# Patient Record
Sex: MALE | Race: WHITE | HISPANIC OR LATINO | Employment: OTHER | URBAN - METROPOLITAN AREA
[De-identification: names, ages, dates, MRNs, and addresses within clinical notes are randomized per-mention and may not be internally consistent; named-entity substitution may affect disease eponyms.]

---

## 2017-09-15 ENCOUNTER — OFFICE VISIT (OUTPATIENT)
Dept: FAMILY MEDICINE CLINIC | Facility: CLINIC | Age: 72
End: 2017-09-15

## 2017-09-15 VITALS
HEIGHT: 66 IN | WEIGHT: 137 LBS | BODY MASS INDEX: 22.02 KG/M2 | OXYGEN SATURATION: 97 % | SYSTOLIC BLOOD PRESSURE: 142 MMHG | HEART RATE: 61 BPM | RESPIRATION RATE: 16 BRPM | DIASTOLIC BLOOD PRESSURE: 80 MMHG

## 2017-09-15 DIAGNOSIS — Z11.4 ENCOUNTER FOR SCREENING FOR HIV: ICD-10-CM

## 2017-09-15 DIAGNOSIS — N40.1 BENIGN NODULAR PROSTATIC HYPERPLASIA WITH LOWER URINARY TRACT SYMPTOMS: ICD-10-CM

## 2017-09-15 DIAGNOSIS — E55.9 VITAMIN D DEFICIENCY: ICD-10-CM

## 2017-09-15 DIAGNOSIS — Z79.4 TYPE 2 DIABETES MELLITUS WITHOUT COMPLICATION, WITH LONG-TERM CURRENT USE OF INSULIN (HCC): ICD-10-CM

## 2017-09-15 DIAGNOSIS — Z00.00 INITIAL MEDICARE ANNUAL WELLNESS VISIT: Primary | ICD-10-CM

## 2017-09-15 DIAGNOSIS — Z23 IMMUNIZATION DUE: ICD-10-CM

## 2017-09-15 DIAGNOSIS — R35.1 NOCTURIA: ICD-10-CM

## 2017-09-15 DIAGNOSIS — E11.9 TYPE 2 DIABETES MELLITUS WITHOUT COMPLICATION, WITH LONG-TERM CURRENT USE OF INSULIN (HCC): ICD-10-CM

## 2017-09-15 LAB
BILIRUB BLD-MCNC: NEGATIVE MG/DL
CLARITY, POC: CLEAR
COLOR UR: YELLOW
GLUCOSE UR STRIP-MCNC: ABNORMAL MG/DL
HBA1C MFR BLD: 12.3 %
KETONES UR QL: NEGATIVE
LEUKOCYTE EST, POC: NEGATIVE
NITRITE UR-MCNC: NEGATIVE MG/ML
PH UR: 5.5 [PH] (ref 5–8)
POC CREATININE URINE: 100
POC MICROALBUMIN URINE: 80
PROT UR STRIP-MCNC: ABNORMAL MG/DL
RBC # UR STRIP: ABNORMAL /UL
SP GR UR: 1.01 (ref 1–1.03)
UROBILINOGEN UR QL: NORMAL

## 2017-09-15 PROCEDURE — 90670 PCV13 VACCINE IM: CPT | Performed by: FAMILY MEDICINE

## 2017-09-15 PROCEDURE — 83036 HEMOGLOBIN GLYCOSYLATED A1C: CPT | Performed by: FAMILY MEDICINE

## 2017-09-15 PROCEDURE — 81003 URINALYSIS AUTO W/O SCOPE: CPT | Performed by: FAMILY MEDICINE

## 2017-09-15 PROCEDURE — G0438 PPPS, INITIAL VISIT: HCPCS | Performed by: FAMILY MEDICINE

## 2017-09-15 PROCEDURE — G0008 ADMIN INFLUENZA VIRUS VAC: HCPCS | Performed by: FAMILY MEDICINE

## 2017-09-15 PROCEDURE — G0009 ADMIN PNEUMOCOCCAL VACCINE: HCPCS | Performed by: FAMILY MEDICINE

## 2017-09-15 PROCEDURE — 90662 IIV NO PRSV INCREASED AG IM: CPT | Performed by: FAMILY MEDICINE

## 2017-09-15 PROCEDURE — 93000 ELECTROCARDIOGRAM COMPLETE: CPT | Performed by: FAMILY MEDICINE

## 2017-09-15 PROCEDURE — 99213 OFFICE O/P EST LOW 20 MIN: CPT | Performed by: FAMILY MEDICINE

## 2017-09-15 PROCEDURE — 82044 UR ALBUMIN SEMIQUANTITATIVE: CPT | Performed by: FAMILY MEDICINE

## 2017-09-15 RX ORDER — LISINOPRIL 20 MG/1
20 TABLET ORAL DAILY
Qty: 90 TABLET | Refills: 3 | Status: SHIPPED | OUTPATIENT
Start: 2017-09-15 | End: 2019-09-12 | Stop reason: SDUPTHER

## 2017-09-15 RX ORDER — TAMSULOSIN HYDROCHLORIDE 0.4 MG/1
1 CAPSULE ORAL NIGHTLY
Qty: 90 CAPSULE | Refills: 1 | Status: SHIPPED | OUTPATIENT
Start: 2017-09-15 | End: 2019-09-12

## 2017-09-15 RX ORDER — SYRINGE-NEEDLE,INSULIN,0.5 ML 27GX1/2"
SYRINGE, EMPTY DISPOSABLE MISCELLANEOUS
Qty: 100 EACH | Refills: 11 | Status: SHIPPED | OUTPATIENT
Start: 2017-09-15 | End: 2018-10-30 | Stop reason: SDUPTHER

## 2017-09-15 RX ORDER — ATORVASTATIN CALCIUM 20 MG/1
20 TABLET, FILM COATED ORAL DAILY
Qty: 30 TABLET | Refills: 3 | Status: SHIPPED | OUTPATIENT
Start: 2017-09-15 | End: 2018-10-30 | Stop reason: SDUPTHER

## 2017-09-15 NOTE — PROGRESS NOTES
The ABCs of the Annual Wellness Visit    HEALTH RISK ASSESSMENT  INITIAL Medicare Wellness Visit  1945    Recent Hospitalizations: NONE    Current Medical Providers: Patient Care Team:  Jai Rubio MD as PCP - Family Medicine    Smoking Status   History   Smoking Status   • Never Smoker   Smokeless Tobacco   • Never Used     Alcohol Consumption   History   Alcohol Use No     Depression Screen   PHQ-2 Depression Screening  Little interest or pleasure in doing things? 0   Feeling down, depressed, or hopeless? 0   PHQ-2 Total Score 0     Fall Risk Assessment  Fallen in past 6 months: 0--> No  Mental Status: 0--> no mental status change  Mobility: 0--> No mobility issues  Medications: 0--> No meds  Total Fall Risk Score: 2    Does the patient have evidence of cognitive impairment? No  Aspirin use counseling: Aspirin 81 mg po once daily advised.    Recent Lab Results:  None to review.  Previous care in [place] (data deficit today)    Subjective     History of Present Illness  Michael Valles is a 71 y.o. male who presents for an Initial Wellness Visit.  He is also here to discuss his DM2.  He reports previous care in [place].  He would like a refill of his insulin previously prescribed in [place].  He takes NPH BID.  He has been out of his medication.  He describes non-compliance with routine blood sugar monitoring.  He reports that his last eye exam was in 2016 when he had cataract surgery (he does not recall MD's name).  He is amendable to referrals and equipment scripts.  He plans to implement lifestyle changes including routine blood sugar monitoring.    Review of Systems   Constitutional: Negative.    HENT: Negative.    Eyes: Negative.    Respiratory: Negative.    Cardiovascular: Negative.    Gastrointestinal: Negative.    Endocrine: Negative.  Negative for polydipsia, polyphagia and polyuria.   Genitourinary: Negative.  Negative for difficulty urinating and hematuria.   Musculoskeletal: Negative.   "  Skin: Negative.    Allergic/Immunologic: Negative.    Neurological: Negative.    Hematological: Negative.    Psychiatric/Behavioral: Negative.    All other systems reviewed and are negative.    The following portions of the patient's history were reviewed and updated as appropriate: past medical history, past surgical history, allergies, current medications, past family history and social history.      Objective     Visual Acuity    Visual Acuity Screening    Right eye Left eye Both eyes   With correction: 20/20 20/20 20/20     Vitals:    09/15/17 1517   BP: 142/80   Pulse: 61   Resp: 16   SpO2: 97%   Weight: 137 lb (62.1 kg)   Height: 66\" (167.6 cm)   PainSc: 0-No pain     Body mass index is 22.11 kg/(m^2). Discussed the patient's BMI with him. The BMI is in the acceptable range.    Physical Exam   Constitutional: He is oriented to person, place, and time. He appears well-developed and well-nourished. He is cooperative.   HENT:   Head: Normocephalic and atraumatic.   Right Ear: Hearing and external ear normal.   Left Ear: Hearing and external ear normal.   Nose: Nose normal.   Mouth/Throat: Uvula is midline, oropharynx is clear and moist and mucous membranes are normal.   Eyes: Conjunctivae and EOM are normal. Pupils are equal, round, and reactive to light. No scleral icterus.   Neck: Trachea normal and normal range of motion. Neck supple. No JVD present. Carotid bruit is not present. No thyromegaly present.   Cardiovascular: Normal rate, regular rhythm, normal heart sounds and intact distal pulses.    Pulmonary/Chest: Effort normal and breath sounds normal.   Abdominal: Soft. Bowel sounds are normal. There is no hepatosplenomegaly. There is no tenderness.   Musculoskeletal: Normal range of motion.    Michael had a diabetic foot exam performed today.   During the foot exam he had a monofilament test performed.    Neurological Sensory Findings - Unaltered hot/cold right ankle/foot discrimination and unaltered " hot/cold left ankle/foot discrimination. Unaltered sharp/dull right ankle/foot discrimination and unaltered sharp/dull left ankle/foot discrimination. No right ankle/foot altered proprioception and no left ankle/foot altered proprioception    Vascular Status -  His exam exhibits right foot vasculature normal. His exam exhibits no right foot edema. His exam exhibits left foot vasculature normal. His exam exhibits no left foot edema.   Skin Integrity  -  His right foot skin is intact.     Michael 's left foot skin is intact. .  Lymphadenopathy:     He has no cervical adenopathy.   Neurological: He is alert and oriented to person, place, and time. He has normal strength and normal reflexes. No sensory deficit. Gait normal.   Skin: Skin is warm and dry.   Psychiatric: He has a normal mood and affect. His speech is normal and behavior is normal. Judgment and thought content normal. Cognition and memory are normal.   Nursing note and vitals reviewed.        ECG 12 Lead  Date/Time: 9/15/2017 4:06 PM  Performed by: KEREN VEGA  Authorized by: KEREN VEGA   Comparison: not compared with previous ECG   Previous ECG: no previous ECG available  Rhythm: sinus rhythm  Rate: normal  BPM: 60  Conduction: conduction normal  ST Segments: ST segments normal  T Waves: T waves normal  QRS axis: normal  Clinical impression: normal ECG        HgbA1c is 12.3% today    Compared to one year ago, the patient feels his physical health is the same.  Compared to one year ago, the patient feels his mental health is the same.    Age-appropriate Screening Schedule  Refer to the list below for future screening recommendations based on patient's age, sex and/or medical conditions. Orders for these recommended tests are listed in the plan section. The patient has been provided with a written plan.    Health Maintenance   Topic Date Due   • HEMOGLOBIN A1C  03/15/2018   • PNEUMOCOCCAL VACCINES (65+ LOW/MEDIUM RISK) (2 of 2 - PPSV23) 09/15/2018   •  DIABETIC FOOT EXAM  09/15/2018   • LIPID PANEL  09/15/2018   • DIABETIC EYE EXAM  09/15/2018   • URINE MICROALBUMIN  09/15/2018   • COLONOSCOPY  09/15/2027   • TDAP/TD VACCINES (2 - Td) 09/15/2027   • INFLUENZA VACCINE  Completed   • ZOSTER VACCINE  Addressed     Advance Care Planning  has NO advance directive - not interested in additional information    Identification of Risk Factors  Risk factors include: cardiovascular risk.    Assessment/Plan   Patient Self-Management and Personalized Health Advice  The patient has been provided with information about: diet, exercise, prevention of cardiac or vascular disease, fall prevention and designing advance directives and preventive services including:   · Advance directive, Colorectal cancer screening, colonoscopy referral placed, Counseling for cardiovascular disease risk reduction, Diabetes screening, see lab orders, Exercise counseling provided, Fall Risk assessment done, Glaucoma screening recommended, Influenza vaccine, Nutrition counseling provided, Pneumococcal vaccine , Prostate cancer screening discussed, Screening electrocardiogram.    Visit Diagnoses:    ICD-10-CM ICD-9-CM   1. Initial Medicare annual wellness visit Z00.00 V70.0   2. Type 2 diabetes mellitus without complication, with long-term current use of insulin E11.9 250.00    Z79.4 V58.67   3. Benign nodular prostatic hyperplasia with lower urinary tract symptoms N40.1 600.10   4. Nocturia  R35.1 788.43   5. Immunization due Z23 V05.9   6. Encounter for screening for HIV  Z11.4 V73.89   7. Vitamin D deficiency  E55.9 268.9     Orders Placed This Encounter   Procedures   • Pneumococcal Conjugate Vaccine 13-Valent All   • Flu Vaccine High Dose PF 65YR+ (FLUZONE 0535-2085)    Vaccine counseling and current VIS is provided for immunizations administered today.   • Comprehensive Metabolic Panel   • Lipid Panel   • TSH   • T4, Free   • Uric Acid   • C-reactive Protein   • PSA   • HIV-1 / O / 2 Ag / Antibody  "4th Generation   • Hepatitis C Antibody   • Vitamin D 25 Hydroxy   • Ambulatory Referral For Screening Colonoscopy   • Ambulatory Referral to Diabetic Education   • Ambulatory Referral to Ophthalmology   • POC Urinalysis Dipstick, Automated   • POC Glycosylated Hemoglobin (Hb A1C)   • POC Microalbumin   • ECG 12 Lead   • CBC & Differential       Outpatient Encounter Prescriptions as of 9/15/2017   Medication Sig Dispense Refill   • atorvastatin (LIPITOR) 20 MG tablet Take 1 tablet by mouth Daily. 30 tablet 3   • Blood Glucose Monitoring Suppl kit Use to check blood sugar BID 1 each 0   • glucose blood test strip Use to check blood sugar BID 60 each 12   • NOVOLIN 70/30 100 UNIT/ML injection Inject 20 Units under the skin 2 Times a Day With Meals. 10 mL 3   • Syringe-Needle U-100 31G X 5/16\" 1 ML Use to administer insulin  each 11   • Lancets 30G misc Use to check blood sugar BID 60 each 11   • lisinopril 20 MG tablet Take 1 tablet by mouth Daily. 90 tablet 3   • tamsulosin 0.4 MG capsule 24 hr cap Take 1 capsule by mouth Every Night. 90 capsule 1     No facility-administered encounter medications on file as of 9/15/2017.      Reviewed use of high risk medication in the elderly: Not applicable.  Reviewed for potential of harmful drug interactions in the elderly: Not applicable.    Follow Up:  Return in about 3 months (around 12/15/2017), or if symptoms worsen or fail to improve.     An After Visit Summary and PPPS with all of these plans were given to the patient.        Jai Rubio MD 09/15/17 6:14 PM    "

## 2017-09-16 LAB
25(OH)D3+25(OH)D2 SERPL-MCNC: 22.9 NG/ML (ref 30–100)
ALBUMIN SERPL-MCNC: 3.9 G/DL (ref 3.5–4.8)
ALBUMIN/GLOB SERPL: 1.3 {RATIO} (ref 1.2–2.2)
ALP SERPL-CCNC: 89 IU/L (ref 39–117)
ALT SERPL-CCNC: 15 IU/L (ref 0–44)
AST SERPL-CCNC: 8 IU/L (ref 0–40)
BASOPHILS # BLD AUTO: 0.1 X10E3/UL (ref 0–0.2)
BASOPHILS NFR BLD AUTO: 1 %
BILIRUB SERPL-MCNC: 0.3 MG/DL (ref 0–1.2)
BUN SERPL-MCNC: 19 MG/DL (ref 8–27)
BUN/CREAT SERPL: 20 (ref 10–24)
CALCIUM SERPL-MCNC: 9.4 MG/DL (ref 8.6–10.2)
CHLORIDE SERPL-SCNC: 97 MMOL/L (ref 96–106)
CHOLEST SERPL-MCNC: 196 MG/DL (ref 100–199)
CO2 SERPL-SCNC: 19 MMOL/L (ref 18–29)
CREAT SERPL-MCNC: 0.93 MG/DL (ref 0.76–1.27)
CRP SERPL-MCNC: <0.3 MG/L (ref 0–4.9)
EOSINOPHIL # BLD AUTO: 0.2 X10E3/UL (ref 0–0.4)
EOSINOPHIL NFR BLD AUTO: 2 %
ERYTHROCYTE [DISTWIDTH] IN BLOOD BY AUTOMATED COUNT: 13.3 % (ref 12.3–15.4)
GLOBULIN SER CALC-MCNC: 2.9 G/DL (ref 1.5–4.5)
GLUCOSE SERPL-MCNC: 322 MG/DL (ref 65–99)
HCT VFR BLD AUTO: 43.9 % (ref 37.5–51)
HCV AB S/CO SERPL IA: NORMAL S/CO RATIO
HDLC SERPL-MCNC: 64 MG/DL
HGB BLD-MCNC: 14.5 G/DL (ref 12.6–17.7)
HIV 1+2 AB+HIV1 P24 AG SERPL QL IA: NORMAL
IMM GRANULOCYTES # BLD: 0 X10E3/UL (ref 0–0.1)
IMM GRANULOCYTES NFR BLD: 0 %
LDLC SERPL CALC-MCNC: 114 MG/DL (ref 0–99)
LYMPHOCYTES # BLD AUTO: 3 X10E3/UL (ref 0.7–3.1)
LYMPHOCYTES NFR BLD AUTO: 35 %
Lab: NORMAL
MCH RBC QN AUTO: 31.9 PG (ref 26.6–33)
MCHC RBC AUTO-ENTMCNC: 33 G/DL (ref 31.5–35.7)
MCV RBC AUTO: 97 FL (ref 79–97)
MONOCYTES # BLD AUTO: 0.5 X10E3/UL (ref 0.1–0.9)
MONOCYTES NFR BLD AUTO: 6 %
NEUTROPHILS # BLD AUTO: 4.8 X10E3/UL (ref 1.4–7)
NEUTROPHILS NFR BLD AUTO: 56 %
PLATELET # BLD AUTO: 164 X10E3/UL (ref 150–379)
POTASSIUM SERPL-SCNC: 5.1 MMOL/L (ref 3.5–5.2)
PROT SERPL-MCNC: 6.8 G/DL (ref 6–8.5)
PSA SERPL-MCNC: 0.3 NG/ML (ref 0–4)
RBC # BLD AUTO: 4.55 X10E6/UL (ref 4.14–5.8)
SODIUM SERPL-SCNC: 137 MMOL/L (ref 134–144)
SPECIMEN STATUS: NORMAL
T4 FREE SERPL-MCNC: 1.27 NG/DL (ref 0.82–1.77)
TRIGL SERPL-MCNC: 89 MG/DL (ref 0–149)
TSH SERPL DL<=0.005 MIU/L-ACNC: 1.94 UIU/ML (ref 0.45–4.5)
URATE SERPL-MCNC: 3.7 MG/DL (ref 3.7–8.6)
VLDLC SERPL CALC-MCNC: 18 MG/DL (ref 5–40)
WBC # BLD AUTO: 8.5 X10E3/UL (ref 3.4–10.8)

## 2018-10-26 ENCOUNTER — OFFICE VISIT (OUTPATIENT)
Dept: FAMILY MEDICINE CLINIC | Facility: CLINIC | Age: 73
End: 2018-10-26

## 2018-10-26 VITALS
OXYGEN SATURATION: 98 % | HEIGHT: 66 IN | SYSTOLIC BLOOD PRESSURE: 146 MMHG | RESPIRATION RATE: 16 BRPM | HEART RATE: 58 BPM | DIASTOLIC BLOOD PRESSURE: 86 MMHG | TEMPERATURE: 97.6 F | WEIGHT: 133.8 LBS | BODY MASS INDEX: 21.5 KG/M2

## 2018-10-26 DIAGNOSIS — Z00.00 MEDICARE ANNUAL WELLNESS VISIT, SUBSEQUENT: Primary | ICD-10-CM

## 2018-10-26 DIAGNOSIS — Z23 IMMUNIZATION DUE: ICD-10-CM

## 2018-10-26 DIAGNOSIS — E11.9 TYPE 2 DIABETES MELLITUS WITHOUT COMPLICATION, WITH LONG-TERM CURRENT USE OF INSULIN (HCC): ICD-10-CM

## 2018-10-26 DIAGNOSIS — F51.04 PSYCHOPHYSIOLOGICAL INSOMNIA: ICD-10-CM

## 2018-10-26 DIAGNOSIS — Z79.4 TYPE 2 DIABETES MELLITUS WITHOUT COMPLICATION, WITH LONG-TERM CURRENT USE OF INSULIN (HCC): ICD-10-CM

## 2018-10-26 DIAGNOSIS — Z12.5 PROSTATE CANCER SCREENING: ICD-10-CM

## 2018-10-26 LAB
ALBUMIN SERPL-MCNC: 3.99 G/DL (ref 3.2–4.8)
ALBUMIN/GLOB SERPL: 1.7 G/DL (ref 1.5–2.5)
ALP SERPL-CCNC: 84 U/L (ref 25–100)
ALT SERPL W P-5'-P-CCNC: 18 U/L (ref 7–40)
ANION GAP SERPL CALCULATED.3IONS-SCNC: 6 MMOL/L (ref 3–11)
ARTICHOKE IGE QN: 119 MG/DL (ref 0–130)
AST SERPL-CCNC: 15 U/L (ref 0–33)
BASOPHILS # BLD AUTO: 0.07 10*3/MM3 (ref 0–0.2)
BASOPHILS NFR BLD AUTO: 0.8 % (ref 0–1)
BILIRUB SERPL-MCNC: 0.5 MG/DL (ref 0.3–1.2)
BUN BLD-MCNC: 20 MG/DL (ref 9–23)
BUN/CREAT SERPL: 22.2 (ref 7–25)
CALCIUM SPEC-SCNC: 9.1 MG/DL (ref 8.7–10.4)
CHLORIDE SERPL-SCNC: 99 MMOL/L (ref 99–109)
CHOLEST SERPL-MCNC: 182 MG/DL (ref 0–200)
CO2 SERPL-SCNC: 30 MMOL/L (ref 20–31)
CREAT BLD-MCNC: 0.9 MG/DL (ref 0.6–1.3)
DEPRECATED RDW RBC AUTO: 45.2 FL (ref 37–54)
EOSINOPHIL # BLD AUTO: 0.23 10*3/MM3 (ref 0–0.3)
EOSINOPHIL NFR BLD AUTO: 2.6 % (ref 0–3)
ERYTHROCYTE [DISTWIDTH] IN BLOOD BY AUTOMATED COUNT: 13 % (ref 11.3–14.5)
GFR SERPL CREATININE-BSD FRML MDRD: 83 ML/MIN/1.73
GLOBULIN UR ELPH-MCNC: 2.4 GM/DL
GLUCOSE BLD-MCNC: 230 MG/DL (ref 70–100)
HBA1C MFR BLD: 13 % (ref 4.8–5.6)
HCT VFR BLD AUTO: 45.1 % (ref 38.9–50.9)
HDLC SERPL-MCNC: 53 MG/DL (ref 40–60)
HGB BLD-MCNC: 15.2 G/DL (ref 13.1–17.5)
IMM GRANULOCYTES # BLD: 0.06 10*3/MM3 (ref 0–0.03)
IMM GRANULOCYTES NFR BLD: 0.7 % (ref 0–0.6)
LYMPHOCYTES # BLD AUTO: 3.43 10*3/MM3 (ref 0.6–4.8)
LYMPHOCYTES NFR BLD AUTO: 38.2 % (ref 24–44)
MCH RBC QN AUTO: 32.1 PG (ref 27–31)
MCHC RBC AUTO-ENTMCNC: 33.7 G/DL (ref 32–36)
MCV RBC AUTO: 95.1 FL (ref 80–99)
MONOCYTES # BLD AUTO: 0.47 10*3/MM3 (ref 0–1)
MONOCYTES NFR BLD AUTO: 5.2 % (ref 0–12)
NEUTROPHILS # BLD AUTO: 4.79 10*3/MM3 (ref 1.5–8.3)
NEUTROPHILS NFR BLD AUTO: 53.2 % (ref 41–71)
PLATELET # BLD AUTO: 238 10*3/MM3 (ref 150–450)
PMV BLD AUTO: 12.4 FL (ref 6–12)
POTASSIUM BLD-SCNC: 5.2 MMOL/L (ref 3.5–5.5)
PROT SERPL-MCNC: 6.4 G/DL (ref 5.7–8.2)
PSA SERPL-MCNC: 0.32 NG/ML (ref 0–4)
RBC # BLD AUTO: 4.74 10*6/MM3 (ref 4.2–5.76)
SODIUM BLD-SCNC: 135 MMOL/L (ref 132–146)
TRIGL SERPL-MCNC: 122 MG/DL (ref 0–150)
TSH SERPL DL<=0.05 MIU/L-ACNC: 1.56 MIU/ML (ref 0.35–5.35)
URATE SERPL-MCNC: 3.9 MG/DL (ref 3.7–9.2)
WBC NRBC COR # BLD: 8.99 10*3/MM3 (ref 3.5–10.8)

## 2018-10-26 PROCEDURE — 36415 COLL VENOUS BLD VENIPUNCTURE: CPT | Performed by: FAMILY MEDICINE

## 2018-10-26 PROCEDURE — G0009 ADMIN PNEUMOCOCCAL VACCINE: HCPCS | Performed by: FAMILY MEDICINE

## 2018-10-26 PROCEDURE — 84550 ASSAY OF BLOOD/URIC ACID: CPT | Performed by: FAMILY MEDICINE

## 2018-10-26 PROCEDURE — G0439 PPPS, SUBSEQ VISIT: HCPCS | Performed by: FAMILY MEDICINE

## 2018-10-26 PROCEDURE — 90662 IIV NO PRSV INCREASED AG IM: CPT | Performed by: FAMILY MEDICINE

## 2018-10-26 PROCEDURE — 83036 HEMOGLOBIN GLYCOSYLATED A1C: CPT | Performed by: FAMILY MEDICINE

## 2018-10-26 PROCEDURE — 80061 LIPID PANEL: CPT | Performed by: FAMILY MEDICINE

## 2018-10-26 PROCEDURE — 84443 ASSAY THYROID STIM HORMONE: CPT | Performed by: FAMILY MEDICINE

## 2018-10-26 PROCEDURE — G0008 ADMIN INFLUENZA VIRUS VAC: HCPCS | Performed by: FAMILY MEDICINE

## 2018-10-26 PROCEDURE — 85025 COMPLETE CBC W/AUTO DIFF WBC: CPT | Performed by: FAMILY MEDICINE

## 2018-10-26 PROCEDURE — G0103 PSA SCREENING: HCPCS | Performed by: FAMILY MEDICINE

## 2018-10-26 PROCEDURE — 90732 PPSV23 VACC 2 YRS+ SUBQ/IM: CPT | Performed by: FAMILY MEDICINE

## 2018-10-26 PROCEDURE — 80053 COMPREHEN METABOLIC PANEL: CPT | Performed by: FAMILY MEDICINE

## 2018-10-26 RX ORDER — TRAZODONE HYDROCHLORIDE 50 MG/1
50 TABLET ORAL NIGHTLY
Qty: 30 TABLET | Refills: 5 | Status: SHIPPED | OUTPATIENT
Start: 2018-10-26 | End: 2019-09-12

## 2018-10-26 NOTE — PROGRESS NOTES
The ABCs of the Annual Wellness Visit    HEALTH RISK ASSESSMENT  SUBSEQUENT Medicare Wellness Visit   1945    Recent Hospitalizations: NONE    Current Medical Providers: Patient Care Team:  Jai Rubio MD as PCP - Family Medicine    Smoking Status   History   Smoking Status   • Never Smoker   Smokeless Tobacco   • Never Used     Alcohol Consumption   History   Alcohol Use No     Depression Screen   PHQ-2 Depression Screening  Little interest or pleasure in doing things? 0   Feeling down, depressed, or hopeless? 0   PHQ-2 Total Score 0        Health Habits and Functional and Cognitive Screening  Functional & Cognitive Status 10/26/2018   Do you have difficulty preparing food and eating? No   Do you have difficulty bathing yourself, getting dressed or grooming yourself? No   Do you have difficulty using the toilet? No   Do you have difficulty moving around from place to place? No   Do you have trouble with steps or getting out of a bed or a chair? No   In the past year have you fallen or experienced a near fall? No   Current Diet Well Balanced Diet   Dental Exam Up to date   Eye Exam Up to date   Exercise (times per week) 7 times per week   Current Exercise Activities Include Walking   Do you need help using the phone?  No   Are you deaf or do you have serious difficulty hearing?  No   Do you need help with transportation? No   Do you need help shopping? No   Do you need help preparing meals?  No   Do you need help with housework?  No   Do you need help with laundry? No   Do you need help taking your medications? No   Do you need help managing money? No   Do you ever drive or ride in a car without wearing a seat belt? No   Have you felt unusual stress, anger or loneliness in the last month? No   Who do you live with? Child   If you need help, do you have trouble finding someone available to you? No   Have you been bothered in the last four weeks by sexual problems? No   Do you have difficulty concentrating,  remembering or making decisions? No        Fall Risk Assessment  Fallen in past 6 months: 0--> No  Mental Status: 0--> no mental status change  Mobility: 0--> No mobility issues  Medications: 0--> No meds  Total Fall Risk Score: 2    Does the patient have evidence of cognitive impairment? No  Aspirin use counseling: Aspirin 81 mg po once daily advised.    Recent Lab Results:  Lab Results   Component Value Date     (H) 09/15/2017    BUN 20 10/26/2018    CREATININE 0.90 10/26/2018    EGFRIFNONA 83 10/26/2018    EGFRIFAFRI 95 09/15/2017    BCR 22.2 10/26/2018     10/26/2018    K 5.2 10/26/2018    CO2 30.0 10/26/2018    CALCIUM 9.1 10/26/2018    PROTENTOTREF 6.8 09/15/2017    ALBUMIN 3.99 10/26/2018    LABGLOBREF 2.9 09/15/2017    LABIL2 1.3 09/15/2017    BILITOT 0.5 10/26/2018    ALKPHOS 84 10/26/2018    AST 15 10/26/2018    ALT 18 10/26/2018       Lab Results   Component Value Date    CHLPL 196 09/15/2017    TRIG 122 10/26/2018    HDL 53 10/26/2018       Lab Results   Component Value Date    HGBA1C 13.00 (H) 10/26/2018       Subjective     History of Present Illness  Michael Valles is a 73 y.o. male who presents for an Subsequent Wellness Visit.  He is accompanied by his son.  He spends time in [facility] and is in [place] visiting family.  He continues care with his physician in [place] for his chronic DM2.  He voices no acute symptoms today.  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  He is requesting a medication to help him stay asleep at night.    Review of Systems   Constitutional: Negative.    HENT: Negative.    Eyes: Negative.    Respiratory: Negative.  Negative for shortness of breath.    Cardiovascular: Negative.  Negative for chest pain, palpitations and leg swelling.   Gastrointestinal: Negative.    Endocrine: Negative.  Negative for polydipsia, polyphagia and polyuria.   Genitourinary: Negative.  Negative for difficulty urinating and  "dysuria.   Musculoskeletal: Positive for arthralgias. Negative for gait problem.   Skin: Negative.    Allergic/Immunologic: Negative.    Neurological: Negative.    Hematological: Negative.    Psychiatric/Behavioral: Negative.    All other systems reviewed and are negative.      The following portions of the patient's history were reviewed and updated as appropriate: past medical history, past surgical history, allergies, current medications, past family history and social history.     Outpatient Medications Prior to Visit   Medication Sig Dispense Refill   • Blood Glucose Monitoring Suppl kit Use to check blood sugar BID 1 each 0   • glucose blood test strip Use to check blood sugar BID 60 each 12   • insulin NPH-insulin regular (NOVOLIN 70/30) (70-30) 100 UNIT/ML injection Inject 20 Units under the skin 2 (Two) Times a Day With Meals. 10 mL 3   • Insulin Syringe-Needle U-100 (B-D INS SYR ULTRAFINE 1CC/31G) 31G X 5/16\" 1 ML misc Use to administer insulin  each 11   • Lancets 30G misc Use to check blood sugar BID 60 each 11   • atorvastatin (LIPITOR) 20 MG tablet Take 1 tablet by mouth Daily. 30 tablet 3   • lisinopril (PRINIVIL,ZESTRIL) 20 MG tablet Take 1 tablet by mouth Daily. 90 tablet 3   • tamsulosin (FLOMAX) 0.4 MG capsule 24 hr capsule Take 1 capsule by mouth Every Night. 90 capsule 1     No facility-administered medications prior to visit.        Objective     Visual Acuity    Visual Acuity Screening    Right eye Left eye Both eyes   Without correction:      With correction: 20/20 20/20 20/20     Vitals:    10/26/18 1047   BP: 146/86   Pulse: 58   Resp: 16   Temp: 97.6 °F (36.4 °C)   SpO2: 98%   Weight: 60.7 kg (133 lb 12.8 oz)   Height: 167.6 cm (66\")   PainSc:   4     Body mass index is 21.6 kg/m². Discussed the patient's BMI with him. The BMI is in the acceptable range.    Physical Exam   Constitutional: He is oriented to person, place, and time. He appears well-developed and well-nourished. He is " cooperative.   HENT:   Head: Normocephalic and atraumatic.   Right Ear: Hearing and external ear normal.   Left Ear: Hearing and external ear normal.   Nose: Nose normal.   Mouth/Throat: Uvula is midline, oropharynx is clear and moist and mucous membranes are normal.   Eyes: Pupils are equal, round, and reactive to light. Conjunctivae and EOM are normal. No scleral icterus.   Neck: Trachea normal and normal range of motion. Neck supple. No JVD present. Carotid bruit is not present. No thyromegaly present.   Cardiovascular: Normal rate, regular rhythm, normal heart sounds and intact distal pulses.    Pulmonary/Chest: Effort normal and breath sounds normal.   Abdominal: Soft. Bowel sounds are normal. There is no hepatosplenomegaly. There is no tenderness.   Musculoskeletal: Normal range of motion.   Generalized osteoarthritic changes to his hands, knees and feet.    Michael had a diabetic foot exam performed today.   During the foot exam he had a monofilament test performed.  Lymphadenopathy:     He has no cervical adenopathy.   Neurological: He is alert and oriented to person, place, and time. He has normal strength and normal reflexes. No sensory deficit. Gait normal.   Skin: Skin is warm and dry.   Psychiatric: He has a normal mood and affect. His speech is normal and behavior is normal. Judgment and thought content normal. Cognition and memory are normal.   Nursing note and vitals reviewed.    Compared to one year ago, the patient feels his physical health is the same.  Compared to one year ago, the patient feels his mental health is the same.    Age-appropriate Screening Schedule  Refer to the list below for future screening recommendations based on patient's age, sex and/or medical conditions. Orders for these recommended tests are listed in the plan section. The patient has been provided with a written plan.    Health Maintenance   Topic Date Due   • HEMOGLOBIN A1C  04/26/2019   • DIABETIC FOOT EXAM   10/26/2019   • LIPID PANEL  10/26/2019   • DIABETIC EYE EXAM  10/26/2019   • URINE MICROALBUMIN  10/26/2019   • COLONOSCOPY  09/15/2027   • INFLUENZA VACCINE  Completed   • PNEUMOCOCCAL VACCINES (65+ LOW/MEDIUM RISK)  Completed   • TDAP/TD VACCINES  Excluded   • ZOSTER VACCINE  Excluded     Advance Care Planning  We discussed advance care planning and importance of providing & updating documentation for the medical record.    Identification of Risk Factors  Risk factors include: cardiovascular risk.    Assessment/Plan   Patient Self-Management and Personalized Health Advice  The patient has been provided with information about: diet, exercise and prevention of cardiac or vascular disease and preventive services including:   · Advance directive, Counseling for cardiovascular disease risk reduction, Diabetes screening, see lab orders, Exercise counseling provided, Fall Risk assessment done, Glaucoma screening recommended, Influenza vaccine, Nutrition counseling provided, Pneumococcal vaccine , Prostate cancer screening discussed.    Visit Diagnoses:    ICD-10-CM ICD-9-CM   1. Medicare annual wellness visit, subsequent Z00.00 V70.0   2. Type 2 diabetes mellitus without complication, with long-term current use of insulin (CMS/[facility]) E11.9 250.00    Z79.4 V58.67   3. Psychophysiological insomnia F51.04 307.42   4. Immunization due Z23 V05.9   5. Prostate cancer screening Z12.5 V76.44       Orders Placed This Encounter   Procedures   • Pneumococcal Polysaccharide Vaccine 23-Valent Greater Than or Equal To 1yo Subcutaneous / IM   • Fluzone High Dose =>65Years    Vaccine counseling and current VIS is provided for immunizations administered today.   • Comprehensive Metabolic Panel   • Lipid Panel   • TSH   • Uric Acid   • Hemoglobin A1c   • PSA Screen   • CBC Auto Differential       Current Outpatient Prescriptions:   •  insulin NPH-insulin regular (70-30) 100 UNIT/ML injection, Inject 20 Units under the skin 2 Times a Day  With Meals.  •  atorvastatin (LIPITOR) 20 MG tablet, Take 1 tablet by mouth Daily., Disp: 30 tablet, Rfl: 3  •  lisinopril (PRINIVIL,ZESTRIL) 20 MG tablet, Take 1 tablet by mouth Daily., Disp: 90 tablet, Rfl: 3  •  tamsulosin (FLOMAX) 0.4 MG capsule 24 hr capsule, Take 1 capsule by mouth Every Night., Disp: 90 capsule, Rfl: 1  •  traZODone (DESYREL) 50 MG tablet, Take 1 tablet by mouth Every Night., Disp: 30 tablet, Rfl: 5    Current outpatient and discharge medications have been reconciled for the patient.  Reviewed by: Jai Rubio MD    Reviewed use of high risk medication in the elderly: Not applicable.  Reviewed for potential of harmful drug interactions in the elderly: Not applicable.    Follow Up:  Return in about 1 year (around 10/26/2019), or if symptoms worsen or fail to improve, for Medicare Wellness.     An After Visit Summary and PPPS with all of these plans were given to the patient.         Jai Rubio MD 10/26/18 5:02 PM

## 2018-10-30 ENCOUNTER — OFFICE VISIT (OUTPATIENT)
Dept: FAMILY MEDICINE CLINIC | Facility: CLINIC | Age: 73
End: 2018-10-30

## 2018-10-30 VITALS
SYSTOLIC BLOOD PRESSURE: 136 MMHG | RESPIRATION RATE: 16 BRPM | DIASTOLIC BLOOD PRESSURE: 80 MMHG | OXYGEN SATURATION: 97 % | HEART RATE: 64 BPM | WEIGHT: 137.8 LBS | BODY MASS INDEX: 22.14 KG/M2 | HEIGHT: 66 IN

## 2018-10-30 DIAGNOSIS — R41.3 MEMORY CHANGES: ICD-10-CM

## 2018-10-30 DIAGNOSIS — I10 ESSENTIAL HYPERTENSION: ICD-10-CM

## 2018-10-30 DIAGNOSIS — E78.01 FAMILIAL HYPERCHOLESTEROLEMIA: ICD-10-CM

## 2018-10-30 DIAGNOSIS — E11.65 TYPE 2 DIABETES MELLITUS WITH HYPERGLYCEMIA, WITH LONG-TERM CURRENT USE OF INSULIN (HCC): Primary | ICD-10-CM

## 2018-10-30 DIAGNOSIS — Z79.4 TYPE 2 DIABETES MELLITUS WITH HYPERGLYCEMIA, WITH LONG-TERM CURRENT USE OF INSULIN (HCC): Primary | ICD-10-CM

## 2018-10-30 PROCEDURE — 99215 OFFICE O/P EST HI 40 MIN: CPT | Performed by: FAMILY MEDICINE

## 2018-10-30 RX ORDER — DONEPEZIL HYDROCHLORIDE 10 MG/1
10 TABLET, FILM COATED ORAL NIGHTLY
Qty: 30 TABLET | Refills: 3 | Status: SHIPPED | OUTPATIENT
Start: 2018-10-30 | End: 2019-09-12

## 2018-10-30 RX ORDER — SYRINGE-NEEDLE,INSULIN,0.5 ML 27GX1/2"
SYRINGE, EMPTY DISPOSABLE MISCELLANEOUS
Qty: 100 EACH | Refills: 11 | Status: SHIPPED | OUTPATIENT
Start: 2018-10-30 | End: 2018-11-13 | Stop reason: SDUPTHER

## 2018-10-30 RX ORDER — ATORVASTATIN CALCIUM 80 MG/1
80 TABLET, FILM COATED ORAL DAILY
Qty: 30 TABLET | Refills: 3 | Status: SHIPPED | OUTPATIENT
Start: 2018-10-30 | End: 2019-09-12 | Stop reason: SDUPTHER

## 2018-10-30 RX ORDER — INSULIN GLARGINE 100 [IU]/ML
10 INJECTION, SOLUTION SUBCUTANEOUS NIGHTLY
Qty: 3 PEN | Refills: 3 | Status: SHIPPED | OUTPATIENT
Start: 2018-10-30 | End: 2019-09-12 | Stop reason: SDUPTHER

## 2018-10-30 NOTE — PROGRESS NOTES
Subjective   Michael Valles is a 73 y.o. male.     History of Present Illness   The patient is here to follow up on his chronic medical problems which include DM2, HTN and HLD.  He is accompanied by his son Leif Cummings.  The patient is tolerating all of his medications well with no adverse events. He describes non-compliance with a healthy heart, diabetic diet.  He reports compliance with daily aerobic activity.  He reports an annual eye exam < one year with good results back in [place].  He describes lack of adherence to routine blood sugar monitoring and reports unknown blood sugar results.  He describes an absence of routine blood pressure monitoring. The patient reports ongoing good foot care and protection. The patient is needing multiple medication refills.  Today his son reports concerns with his father's memory.  He reports good and bad days.  His father does not drive.  He reports his short term memory is impaired.    Review of Systems   Constitutional: Negative for chills and fever.   Eyes: Negative for visual disturbance.   Respiratory: Negative for cough and shortness of breath.    Cardiovascular: Negative for chest pain, palpitations and leg swelling.   Gastrointestinal: Negative for abdominal pain, diarrhea, nausea and vomiting.   Genitourinary: Negative for difficulty urinating.   Musculoskeletal: Negative for arthralgias.   Skin: Negative for rash.   Neurological: Negative for dizziness, tremors, weakness and headaches.   Psychiatric/Behavioral: Positive for confusion. Negative for behavioral problems and hallucinations. The patient is not nervous/anxious.    All other systems reviewed and are negative.      Objective   Physical Exam   Constitutional: He is oriented to person, place, and time. He appears well-developed and well-nourished.   HENT:   Head: Normocephalic and atraumatic.   Right Ear: Hearing normal.   Left Ear: Hearing normal.   Mouth/Throat: Mucous membranes are normal.   Eyes:  "Pupils are equal, round, and reactive to light. Conjunctivae and EOM are normal.   Neck: Neck supple. No JVD present. No thyromegaly present.   Cardiovascular: Normal rate, regular rhythm and normal heart sounds.    Pulmonary/Chest: Effort normal and breath sounds normal.   Musculoskeletal: Normal range of motion.   Neurological: He is alert and oriented to person, place, and time. He has normal strength. No sensory deficit. Gait normal.   Skin: Skin is warm and dry.   Psychiatric: He has a normal mood and affect. His behavior is normal. Judgment and thought content normal. He exhibits abnormal recent memory.   Nursing note and vitals reviewed.    Recent A1c 13%    Assessment/Plan   Diagnoses and all orders for this visit:    Type 2 diabetes mellitus with hyperglycemia, with long-term current use of insulin (CMS/[facility])  -     Increase atorvastatin (LIPITOR) 80 MG tablet; Take 1 tablet by mouth Daily.  -     NOVOLIN 70/30 100 UNIT/ML injection; Inject 20 Units sq 2 Times a Day With Meals.  -     Start BASAGLAR KWIKPEN 100 UNIT/ML injection pen; Inject 10 Units sq q HS.  -     metFORMIN 500 MG tablet; Take 2 tablets by mouth 2 Times a Day With Meals.  -     Ambulatory Referral to Endocrinology  -     Ambulatory Referral to Diabetic Education  -     Ambulatory Referral to Ophthalmology  -     Insulin Syringe-Needle U-100  31G X 5/16\" 1 ML misc; Use to administer insulin BID  -     glucose blood (ACCU-CHEK COMPACT PLUS) test strip; Use to check blood sugar BID  -     Lancets (ACCU-CHEK SOFT TOUCH) lancets; Use to check blood sugar BID    Essential hypertension  - Healthy heart diet  - Lisinopril 20 mg po daily  - Daily aerobic exercise  - Routine blood pressure monitoring  - [place] Heart Disease and Stroke Prevention Task Force pamphlet reviewed and administered.    Familial hypercholesterolemia  - Low cholesterol diet (< 200 mg / day)  - Increase Lipitor 80 mg po once daily  - Daily aerobic exercise  - Aspirin 81 mg po " once daily    Memory changes  -     Start donepezil (ARICEPT) 10 MG tablet; Take 1 tablet by mouth Every Night.  - Trazadone 50 mg q HS prn sleep disturbance  -    Today I have spent a total of 40 minutes face to face with Michael Valles and his son today.  During this time, a total of 21 minutes was spent counseling on the nature of the diagnosis including risks and benefits of treatment, complications, implications, management, safe and proper use of medications.  We discussed the work up and specialist referral process if needed for his memory changes.  Today I encouraged therapeutic lifestyle changes including a low calorie, healthy heart diet, daily aerobic exercise and medication compliance.  Patient education is provided today concerning related diagnosis with educational resources reviewed and discussed.  I encouraged compliance with follow up appointment.          RTC in 3 months.

## 2018-11-07 ENCOUNTER — HOSPITAL ENCOUNTER (OUTPATIENT)
Dept: DIABETES SERVICES | Facility: HOSPITAL | Age: 73
Setting detail: RECURRING SERIES
Discharge: HOME | End: 2018-11-07

## 2018-11-07 PROCEDURE — G0108 DIAB MANAGE TRN  PER INDIV: HCPCS | Performed by: DIETITIAN, REGISTERED

## 2018-11-13 DIAGNOSIS — E11.65 TYPE 2 DIABETES MELLITUS WITH HYPERGLYCEMIA, WITH LONG-TERM CURRENT USE OF INSULIN (HCC): ICD-10-CM

## 2018-11-13 DIAGNOSIS — Z79.4 TYPE 2 DIABETES MELLITUS WITH HYPERGLYCEMIA, WITH LONG-TERM CURRENT USE OF INSULIN (HCC): ICD-10-CM

## 2018-11-13 RX ORDER — SYRINGE-NEEDLE,INSULIN,0.5 ML 27GX1/2"
SYRINGE, EMPTY DISPOSABLE MISCELLANEOUS
Qty: 100 EACH | Refills: 11 | Status: SHIPPED | OUTPATIENT
Start: 2018-11-13 | End: 2019-09-12 | Stop reason: SDUPTHER

## 2019-09-12 ENCOUNTER — OFFICE VISIT (OUTPATIENT)
Dept: FAMILY MEDICINE CLINIC | Facility: CLINIC | Age: 74
End: 2019-09-12

## 2019-09-12 VITALS
SYSTOLIC BLOOD PRESSURE: 128 MMHG | TEMPERATURE: 97.6 F | OXYGEN SATURATION: 97 % | HEART RATE: 75 BPM | BODY MASS INDEX: 22.82 KG/M2 | WEIGHT: 142 LBS | HEIGHT: 66 IN | DIASTOLIC BLOOD PRESSURE: 72 MMHG

## 2019-09-12 DIAGNOSIS — Z79.4 TYPE 2 DIABETES MELLITUS WITH HYPERGLYCEMIA, WITH LONG-TERM CURRENT USE OF INSULIN (HCC): Primary | ICD-10-CM

## 2019-09-12 DIAGNOSIS — E11.65 TYPE 2 DIABETES MELLITUS WITH HYPERGLYCEMIA, WITH LONG-TERM CURRENT USE OF INSULIN (HCC): Primary | ICD-10-CM

## 2019-09-12 DIAGNOSIS — I10 ESSENTIAL HYPERTENSION: ICD-10-CM

## 2019-09-12 DIAGNOSIS — E78.01 FAMILIAL HYPERCHOLESTEROLEMIA: ICD-10-CM

## 2019-09-12 LAB
ALBUMIN SERPL-MCNC: 4.4 G/DL (ref 3.5–5.2)
ALBUMIN/GLOB SERPL: 1.6 G/DL
ALP SERPL-CCNC: 83 U/L (ref 39–117)
ALT SERPL W P-5'-P-CCNC: 13 U/L (ref 1–41)
ANION GAP SERPL CALCULATED.3IONS-SCNC: 10 MMOL/L (ref 5–15)
AST SERPL-CCNC: 11 U/L (ref 1–40)
BASOPHILS # BLD AUTO: 0.08 10*3/MM3 (ref 0–0.2)
BASOPHILS NFR BLD AUTO: 1 % (ref 0–1.5)
BILIRUB BLD-MCNC: NEGATIVE MG/DL
BILIRUB SERPL-MCNC: 0.2 MG/DL (ref 0.2–1.2)
BUN BLD-MCNC: 24 MG/DL (ref 8–23)
BUN/CREAT SERPL: 27.6 (ref 7–25)
CALCIUM SPEC-SCNC: 9.1 MG/DL (ref 8.6–10.5)
CHLORIDE SERPL-SCNC: 101 MMOL/L (ref 98–107)
CHOLEST SERPL-MCNC: 186 MG/DL (ref 0–200)
CLARITY, POC: CLEAR
CO2 SERPL-SCNC: 28 MMOL/L (ref 22–29)
COLOR UR: YELLOW
CREAT BLD-MCNC: 0.87 MG/DL (ref 0.76–1.27)
DEPRECATED RDW RBC AUTO: 47.4 FL (ref 37–54)
EOSINOPHIL # BLD AUTO: 0.13 10*3/MM3 (ref 0–0.4)
EOSINOPHIL NFR BLD AUTO: 1.6 % (ref 0.3–6.2)
ERYTHROCYTE [DISTWIDTH] IN BLOOD BY AUTOMATED COUNT: 13.1 % (ref 12.3–15.4)
GFR SERPL CREATININE-BSD FRML MDRD: 86 ML/MIN/1.73
GLOBULIN UR ELPH-MCNC: 2.8 GM/DL
GLUCOSE BLD-MCNC: 70 MG/DL (ref 65–99)
GLUCOSE UR STRIP-MCNC: ABNORMAL MG/DL
HBA1C MFR BLD: 11.11 % (ref 4.8–5.6)
HCT VFR BLD AUTO: 46.3 % (ref 37.5–51)
HDLC SERPL-MCNC: 68 MG/DL (ref 40–60)
HGB BLD-MCNC: 14.4 G/DL (ref 13–17.7)
IMM GRANULOCYTES # BLD AUTO: 0.03 10*3/MM3 (ref 0–0.05)
IMM GRANULOCYTES NFR BLD AUTO: 0.4 % (ref 0–0.5)
KETONES UR QL: NEGATIVE
LDLC SERPL CALC-MCNC: 105 MG/DL (ref 0–100)
LDLC/HDLC SERPL: 1.55 {RATIO}
LEUKOCYTE EST, POC: NEGATIVE
LYMPHOCYTES # BLD AUTO: 2.33 10*3/MM3 (ref 0.7–3.1)
LYMPHOCYTES NFR BLD AUTO: 29.1 % (ref 19.6–45.3)
MCH RBC QN AUTO: 30.8 PG (ref 26.6–33)
MCHC RBC AUTO-ENTMCNC: 31.1 G/DL (ref 31.5–35.7)
MCV RBC AUTO: 98.9 FL (ref 79–97)
MONOCYTES # BLD AUTO: 0.6 10*3/MM3 (ref 0.1–0.9)
MONOCYTES NFR BLD AUTO: 7.5 % (ref 5–12)
NEUTROPHILS # BLD AUTO: 4.84 10*3/MM3 (ref 1.7–7)
NEUTROPHILS NFR BLD AUTO: 60.4 % (ref 42.7–76)
NITRITE UR-MCNC: NEGATIVE MG/ML
NRBC BLD AUTO-RTO: 0 /100 WBC (ref 0–0.2)
PH UR: 5 [PH] (ref 5–8)
PLATELET # BLD AUTO: 222 10*3/MM3 (ref 140–450)
PMV BLD AUTO: 12.6 FL (ref 6–12)
POC CREATININE URINE: 200
POC MICROALBUMIN URINE: 150
POTASSIUM BLD-SCNC: 4.7 MMOL/L (ref 3.5–5.2)
PROT SERPL-MCNC: 7.2 G/DL (ref 6–8.5)
PROT UR STRIP-MCNC: ABNORMAL MG/DL
RBC # BLD AUTO: 4.68 10*6/MM3 (ref 4.14–5.8)
RBC # UR STRIP: ABNORMAL /UL
SODIUM BLD-SCNC: 139 MMOL/L (ref 136–145)
SP GR UR: 1.02 (ref 1–1.03)
TRIGL SERPL-MCNC: 64 MG/DL (ref 0–150)
TSH SERPL DL<=0.05 MIU/L-ACNC: 1.78 UIU/ML (ref 0.27–4.2)
UROBILINOGEN UR QL: NORMAL
VLDLC SERPL-MCNC: 12.8 MG/DL (ref 5–40)
WBC NRBC COR # BLD: 8.01 10*3/MM3 (ref 3.4–10.8)

## 2019-09-12 PROCEDURE — 80061 LIPID PANEL: CPT | Performed by: FAMILY MEDICINE

## 2019-09-12 PROCEDURE — 81003 URINALYSIS AUTO W/O SCOPE: CPT | Performed by: FAMILY MEDICINE

## 2019-09-12 PROCEDURE — 36415 COLL VENOUS BLD VENIPUNCTURE: CPT | Performed by: FAMILY MEDICINE

## 2019-09-12 PROCEDURE — 82044 UR ALBUMIN SEMIQUANTITATIVE: CPT | Performed by: FAMILY MEDICINE

## 2019-09-12 PROCEDURE — 80053 COMPREHEN METABOLIC PANEL: CPT | Performed by: FAMILY MEDICINE

## 2019-09-12 PROCEDURE — 99214 OFFICE O/P EST MOD 30 MIN: CPT | Performed by: FAMILY MEDICINE

## 2019-09-12 PROCEDURE — 84443 ASSAY THYROID STIM HORMONE: CPT | Performed by: FAMILY MEDICINE

## 2019-09-12 PROCEDURE — 83036 HEMOGLOBIN GLYCOSYLATED A1C: CPT | Performed by: FAMILY MEDICINE

## 2019-09-12 PROCEDURE — 85025 COMPLETE CBC W/AUTO DIFF WBC: CPT | Performed by: FAMILY MEDICINE

## 2019-09-12 RX ORDER — INSULIN GLARGINE 100 [IU]/ML
30 INJECTION, SOLUTION SUBCUTANEOUS NIGHTLY
Qty: 3 PEN | Refills: 0 | Status: SHIPPED | OUTPATIENT
Start: 2019-09-12 | End: 2021-12-06

## 2019-09-12 RX ORDER — LISINOPRIL 20 MG/1
20 TABLET ORAL DAILY
Qty: 90 TABLET | Refills: 0 | Status: SHIPPED | OUTPATIENT
Start: 2019-09-12 | End: 2021-12-06

## 2019-09-12 RX ORDER — SYRINGE-NEEDLE,INSULIN,0.5 ML 27GX1/2"
SYRINGE, EMPTY DISPOSABLE MISCELLANEOUS
Qty: 100 EACH | Refills: 11 | Status: SHIPPED | OUTPATIENT
Start: 2019-09-12

## 2019-09-12 RX ORDER — ATORVASTATIN CALCIUM 80 MG/1
80 TABLET, FILM COATED ORAL DAILY
Qty: 90 TABLET | Refills: 0 | Status: SHIPPED | OUTPATIENT
Start: 2019-09-12

## 2019-09-12 NOTE — PROGRESS NOTES
Subjective   Michael Valles is a 73 y.o. male.     History of Present Illness   He is accompanied by his son.  He [place] after living in [facility] for several months.  He is here to follow up on his chronic medical problems which include DM2, HTN and HLD.  The patient presents a list of the medications prescribed by his [place] physician.The patient is tolerating all of his medications well with no adverse events. He describes compliance with a diabetic diet and describes limiting carbohydrates.  He reports compliance with daily aerobic activity.  He reports an annual eye exam < one year with good results.  He conutinues to adherence to routine blood sugar monitoring and reports good results.  Blood sugars are typically under 180.  Adherence to routine blood pressure monitoring is reported. The patient reports ongoing good foot care and protection. The patient is needing multiple medication refills.  The patient is anticipating lab evaluation.      Review of Systems   Constitutional: Negative for chills and fever.   Eyes: Negative for visual disturbance.   Respiratory: Negative for cough and shortness of breath.    Cardiovascular: Negative for chest pain, palpitations and leg swelling.   Gastrointestinal: Negative for abdominal pain, diarrhea, nausea and vomiting.   Endocrine: Negative for polydipsia, polyphagia and polyuria.   Genitourinary: Negative for difficulty urinating.   Musculoskeletal: Positive for back pain. Negative for arthralgias.   Skin: Negative for rash.       Objective   Physical Exam   Constitutional: He is oriented to person, place, and time. He appears well-developed and well-nourished.   HENT:   Head: Normocephalic and atraumatic.   Right Ear: Hearing normal.   Left Ear: Hearing normal.   Mouth/Throat: Mucous membranes are normal.   Eyes: Conjunctivae and EOM are normal. Pupils are equal, round, and reactive to light.   Neck: Neck supple. No JVD present. No thyromegaly  "present.   Cardiovascular: Normal rate, regular rhythm and normal heart sounds.   Pulmonary/Chest: Effort normal and breath sounds normal.   Abdominal: There is no tenderness.   Musculoskeletal:        Lumbar back: He exhibits decreased range of motion and spasm. He exhibits no bony tenderness.   Neurological: He is alert and oriented to person, place, and time. He has normal strength. No sensory deficit. Gait normal.   Skin: Skin is warm and dry.   Psychiatric: He has a normal mood and affect. His behavior is normal. Judgment and thought content normal. Cognition and memory are normal.   Nursing note and vitals reviewed.      Assessment/Plan   Diagnoses and all orders for this visit:    Type 2 diabetes mellitus with hyperglycemia, with long-term current use of insulin (CMS/[facility])  -     atorvastatin (LIPITOR) 80 MG tablet; Take 1 tablet by mouth Daily. #90  -     Insulin Glargine (BASAGLAR KWIKPEN) 100 UNIT/ML injection pen; Inject 30 Units under the skin into the appropriate area as directed Every Night. #3 pens.  -     insulin NPH-insulin regular (NOVOLIN 70/30) (70-30) 100 UNIT/ML injection; Inject 20 Units under the skin into the appropriate area as directed 2 (Two) Times a Day With Meals. #40 ml  -     Insulin Syringe-Needle U-100 (B-D INS SYR ULTRAFINE 1CC/31G) 31G X 5/16\" 1 ML misc; Use to administer insulin BID  -     Lancets (ACCU-CHEK SOFT TOUCH) lancets; Use to check blood sugar TID  -     lisinopril (PRINIVIL,ZESTRIL) 20 MG tablet; Take 1 tablet by mouth Daily. #90  -     metFORMIN 1000 MG tablet; Take 1 tablet by mouth 2 (Two) Times a Day With Meals. #180  -     POC Urinalysis Dipstick, Automated  -     POC Microalbumin  -     Hemoglobin A1c  -     CBC & Differential  -     Comprehensive Metabolic Panel  -     Lipid Panel  -     TSH  -     glucose blood (ACCU-CHEK ACTIVE STRIPS) test strip; Use to check BS TID prn  - Routine blood sugar monitoring  - Diabetic diet  - Continue with daily aerobic " activity  - We discussed statins and ace inhibitor therapy  - Annual eye exams are encouraged  - Continue with good foot care and protection    Essential hypertension  -     lisinopril 20 MG tablet; Take 1 tablet by mouth Daily. #90  -     POC Urinalysis Dipstick, Automated  -     Comprehensive Metabolic Panel  - Healthy heart diet / DASH diet  - Low dose aspirin once daily  - Low sodium diet  - Daily aerobic exercise > 40' > 3 x / week  - Routine blood pressure monitoring  - [place] Heart Disease and Stroke Prevention Task Force pamphlet reviewed and administered.    Familial hypercholesterolemia  -     atorvastatin (LIPITOR) 80 MG tablet; Take 1 tablet by mouth Daily. #90  -     Comprehensive Metabolic Panel  -     Lipid Panel  - Low cholesterol diet (< 200 mg / day)  - Daily aerobic exercise  - Aspirin 81 mg po once daily    RTC in 3 months.

## 2019-09-13 DIAGNOSIS — E11.65 TYPE 2 DIABETES MELLITUS WITH HYPERGLYCEMIA, WITH LONG-TERM CURRENT USE OF INSULIN (HCC): Primary | ICD-10-CM

## 2019-09-13 DIAGNOSIS — Z79.4 TYPE 2 DIABETES MELLITUS WITH HYPERGLYCEMIA, WITH LONG-TERM CURRENT USE OF INSULIN (HCC): Primary | ICD-10-CM

## 2019-09-13 NOTE — TELEPHONE ENCOUNTER
----- Message from Jai Rubio MD sent at 9/13/2019  2:20 PM EDT -----  Regarding: RE: ALEJANDRO MONTANA [facility]   Contact: [phone]  Insulin pen needles sent as requested.    ----- Message -----  From: Betsy Castano  Sent: 9/13/2019   8:56 AM  To: Jai Rubio MD  Subject: FW: ALEJANDRO MONTANA [facility]                             ----- Message -----  From: Brianda Rolon  Sent: 9/13/2019   8:47 AM  To: Betsy Castano  Subject: ALEJANDRO MONTANA [facility]                             NEEDS PIN NEEDLES TO GO WITH BASAGLAR QUICK TEST KIT

## 2019-09-23 ENCOUNTER — OFFICE VISIT (OUTPATIENT)
Dept: FAMILY MEDICINE CLINIC | Facility: CLINIC | Age: 74
End: 2019-09-23

## 2019-09-23 VITALS
RESPIRATION RATE: 16 BRPM | WEIGHT: 142 LBS | SYSTOLIC BLOOD PRESSURE: 150 MMHG | BODY MASS INDEX: 22.82 KG/M2 | OXYGEN SATURATION: 99 % | HEIGHT: 66 IN | DIASTOLIC BLOOD PRESSURE: 60 MMHG | HEART RATE: 60 BPM | TEMPERATURE: 98 F

## 2019-09-23 DIAGNOSIS — Z78.9 TELEPHONE LANGUAGE INTERPRETER SERVICE REQUIRED: ICD-10-CM

## 2019-09-23 DIAGNOSIS — M79.604 LEG PAIN, BILATERAL: Primary | ICD-10-CM

## 2019-09-23 DIAGNOSIS — M54.31 BILATERAL SCIATICA: ICD-10-CM

## 2019-09-23 DIAGNOSIS — M54.32 BILATERAL SCIATICA: ICD-10-CM

## 2019-09-23 DIAGNOSIS — Z78.9 LANGUAGE BARRIER TO COMMUNICATION: ICD-10-CM

## 2019-09-23 DIAGNOSIS — M79.605 LEG PAIN, BILATERAL: Primary | ICD-10-CM

## 2019-09-23 PROCEDURE — 99214 OFFICE O/P EST MOD 30 MIN: CPT | Performed by: NURSE PRACTITIONER

## 2019-09-23 NOTE — PROGRESS NOTES
"Subjective   Michael Valles is a 73 y.o. male.   Chief Complaint   Patient presents with   • Leg Problem     Pt is having burning from his waist down to his feet. Pt states that it is more at night.       History of Present Illness Patient is here with his wife they both speak Citizen of the [place]. He is from [place]. Patient is here for pain to both legs. Has problems with his back already hurting.  Some burning from the thigh and knee area. Bilaterally.   He has not been taking any medication for the pain. He recently had been riding working horses.   He has stopped.     Using the language line solution.       The following portions of the patient's history were reviewed and updated as appropriate: allergies, current medications, past family history, past medical history, past social history, past surgical history and problem list.    Review of Systems   Constitutional: Negative.    HENT: Negative.    Eyes: Negative.    Respiratory: Negative.    Cardiovascular: Negative.    Gastrointestinal: Negative.    Endocrine: Negative.    Musculoskeletal: Positive for back pain and myalgias.   Allergic/Immunologic: Negative.    Neurological: Positive for numbness.   Hematological: Negative.    Psychiatric/Behavioral: Negative.      Past Surgical History:   Procedure Laterality Date   • CYSTOSCOPY TRANSURETHRAL RESECTION OF PROSTATE  2007     Past Medical History:   Diagnosis Date   • BPH (benign prostatic hypertrophy)    • Colonoscopy refused    • DM2 (diabetes mellitus, type 2) (CMS/[facility])    • HLD (hyperlipidemia)    • HTN (hypertension)    • Noncompliance        Objective   No Known Allergies  Visit Vitals  /60   Pulse 60   Temp 98 °F (36.7 °C)   Resp 16   Ht 167.6 cm (66\")   Wt 64.4 kg (142 lb)   SpO2 99%   BMI 22.92 kg/m²       Physical Exam   Constitutional: He appears well-developed and well-nourished.   Cardiovascular: Normal rate, regular rhythm and normal heart sounds.   Pulmonary/Chest: Effort normal and breath " sounds normal.   Abdominal: Soft. Bowel sounds are normal.   Musculoskeletal: Normal range of motion. He exhibits tenderness. He exhibits no edema or deformity.        Lumbar back: He exhibits tenderness and pain. He exhibits normal range of motion, no bony tenderness, no swelling, no edema, no deformity, no laceration, no spasm and normal pulse.   Neurological: He is alert.   Skin: Skin is warm and dry. Capillary refill takes less than 2 seconds.   Psychiatric: He has a normal mood and affect. His behavior is normal.   Vitals reviewed.      Assessment/Plan   Michael was seen today for leg problem.    Diagnoses and all orders for this visit:    Leg pain, bilateral  -     diclofenac (VOLTAREN) 50 MG EC tablet; Take 1 tablet by mouth 2 (Two) Times a Day.    Bilateral sciatica    follow up with Dr. Rubio in 2 weeks and as needed.   May consider physical therapy.   See sciatica patient instructions   Rotate ice 20 minutes on and 2 hours off with heat.                  Patient Instructions   Sciatica    Sciatica is pain, numbness, weakness, or tingling along your sciatic nerve. The sciatic nerve starts in the lower back and goes down the back of each leg. Sciatica happens when this nerve is pinched or has pressure put on it. Sciatica usually goes away on its own or with treatment. Sometimes, sciatica may keep coming back (recur).  Follow these instructions at home:  Medicines  · Take over-the-counter and prescription medicines only as told by your doctor.  · Do not drive or use heavy machinery while taking prescription pain medicine.  Managing pain  · If directed, put ice on the affected area.  ? Put ice in a plastic bag.  ? Place a towel between your skin and the bag.  ? Leave the ice on for 20 minutes, 2-3 times a day.  · After icing, apply heat to the affected area before you exercise or as often as told by your doctor. Use the heat source that your doctor tells you to use, such as a moist heat pack or a heating  pad.  ? Place a towel between your skin and the heat source.  ? Leave the heat on for 20-30 minutes.  ? Remove the heat if your skin turns bright red. This is especially important if you are unable to feel pain, heat, or cold. You may have a greater risk of getting burned.  Activity  · Return to your normal activities as told by your doctor. Ask your doctor what activities are safe for you.  ? Avoid activities that make your sciatica worse.  · Take short rests during the day. Rest in a lying or standing position. This is usually better than sitting to rest.  ? When you rest for a long time, do some physical activity or stretching between periods of rest.  ? Avoid sitting for a long time without moving. Get up and move around at least one time each hour.  · Exercise and stretch regularly, as told by your doctor.  · Do not lift anything that is heavier than 10 lb (4.5 kg) while you have symptoms of sciatica.  ? Avoid lifting heavy things even when you do not have symptoms.  ? Avoid lifting heavy things over and over.  · When you lift objects, always lift in a way that is safe for your body. To do this, you should:  ? Bend your knees.  ? Keep the object close to your body.  ? Avoid twisting.  General instructions  · Use good posture.  ? Avoid leaning forward when you are sitting.  ? Avoid hunching over when you are standing.  · Stay at a healthy weight.  · Wear comfortable shoes that support your feet. Avoid wearing high heels.  · Avoid sleeping on a mattress that is too soft or too hard. You might have less pain if you sleep on a mattress that is firm enough to support your back.  · Keep all follow-up visits as told by your doctor. This is important.  Contact a doctor if:  · You have pain that:  ? Wakes you up when you are sleeping.  ? Gets worse when you lie down.  ? Is worse than the pain you have had in the past.  ? Lasts longer than 4 weeks.  · You lose weight for without trying.  Get help right away if:  · You  cannot control when you pee (urinate) or poop (have a bowel movement).  · You have weakness in any of these areas and it gets worse.  ? Lower back.  ? Lower belly (pelvis).  ? Butt (buttocks).  ? Legs.  · You have redness or swelling of your back.  · You have a burning feeling when you pee.  This information is not intended to replace advice given to you by your health care provider. Make sure you discuss any questions you have with your health care provider.  Document Released: 09/26/2009 Document Revised: 05/25/2017 Document Reviewed: 08/26/2016  [employer] Interactive Patient Education © 2019 [employer]        Rachel Perez, APRN

## 2019-10-03 ENCOUNTER — OFFICE VISIT (OUTPATIENT)
Dept: FAMILY MEDICINE CLINIC | Facility: CLINIC | Age: 74
End: 2019-10-03

## 2019-10-03 VITALS
BODY MASS INDEX: 23.3 KG/M2 | WEIGHT: 145 LBS | SYSTOLIC BLOOD PRESSURE: 132 MMHG | HEIGHT: 66 IN | RESPIRATION RATE: 16 BRPM | HEART RATE: 76 BPM | TEMPERATURE: 98 F | OXYGEN SATURATION: 98 % | DIASTOLIC BLOOD PRESSURE: 78 MMHG

## 2019-10-03 DIAGNOSIS — M47.26 OSTEOARTHRITIS OF SPINE WITH RADICULOPATHY, LUMBAR REGION: Primary | ICD-10-CM

## 2019-10-03 DIAGNOSIS — Z23 IMMUNIZATION DUE: ICD-10-CM

## 2019-10-03 PROCEDURE — 90662 IIV NO PRSV INCREASED AG IM: CPT | Performed by: FAMILY MEDICINE

## 2019-10-03 PROCEDURE — 99214 OFFICE O/P EST MOD 30 MIN: CPT | Performed by: FAMILY MEDICINE

## 2019-10-03 PROCEDURE — G0008 ADMIN INFLUENZA VIRUS VAC: HCPCS | Performed by: FAMILY MEDICINE

## 2019-10-03 RX ORDER — DULOXETIN HYDROCHLORIDE 30 MG/1
30 CAPSULE, DELAYED RELEASE ORAL DAILY
Qty: 30 CAPSULE | Refills: 1 | Status: SHIPPED | OUTPATIENT
Start: 2019-10-03 | End: 2022-03-02

## 2019-10-03 RX ORDER — DICLOFENAC SODIUM 75 MG/1
75 TABLET, DELAYED RELEASE ORAL 2 TIMES DAILY
Qty: 60 TABLET | Refills: 0 | Status: SHIPPED | OUTPATIENT
Start: 2019-10-03 | End: 2021-12-06

## 2019-10-03 RX ORDER — TIZANIDINE 4 MG/1
TABLET ORAL
Qty: 45 TABLET | Refills: 0 | Status: SHIPPED | OUTPATIENT
Start: 2019-10-03

## 2019-10-03 NOTE — PROGRESS NOTES
Subjective   Michael Valles is a 73 y.o. male.     History of Present Illness   He is accompanied by his son.  The patient reports intermittent low back pain for a several years.  It has been worse over the past month.  It is not resolving with home care. There is no recalled injury or trauma.  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  The pain is  characterized as sharp, throbbing & tight.  It is located across the lower back.  It radiates across down into the hips and thighs.  The pain is worse with lifting & activity.  It is better at rest. He is now experiencing associated neurological changes that include numbness and burning to his thighs.  He denies loss of gait or foot drop.   There are no associated rashes, bowel or bladder changes or skin eruptions.    Review of Systems   Constitutional: Negative for chills and fever.   Respiratory: Negative for cough and shortness of breath.    Cardiovascular: Negative for chest pain, palpitations and leg swelling.   Gastrointestinal: Negative for abdominal pain.   Genitourinary: Negative for difficulty urinating.   Musculoskeletal: Positive for back pain. Negative for arthralgias and gait problem.   Skin: Negative for rash.   Neurological: Negative for weakness and numbness.       Objective   Physical Exam   Constitutional: He is oriented to person, place, and time. He appears well-developed and well-nourished.   HENT:   Head: Normocephalic and atraumatic.   Right Ear: Hearing normal.   Left Ear: Hearing normal.   Mouth/Throat: Mucous membranes are normal.   Eyes: Conjunctivae and EOM are normal. Pupils are equal, round, and reactive to light.   Neck: Neck supple. No JVD present. No thyromegaly present.   Cardiovascular: Normal rate, regular rhythm and normal heart sounds.   Pulmonary/Chest: Effort normal and breath sounds normal.   Abdominal: There is no tenderness.   Musculoskeletal:        Lumbar back: He  exhibits decreased range of motion and spasm.   Neurological: He is alert and oriented to person, place, and time. He has normal strength and normal reflexes. No sensory deficit. He exhibits normal muscle tone. Coordination and gait normal.   Skin: Skin is warm and dry.   Psychiatric: He has a normal mood and affect. His behavior is normal. Judgment and thought content normal. Cognition and memory are normal.   Nursing note and vitals reviewed.      Assessment/Plan   Diagnoses and all orders for this visit:    Osteoarthritis of spine with radiculopathy, lumbar region  -     diclofenac (VOLTAREN) 75 MG EC tablet; Take 1 tablet by mouth 2 (Two) Times a Day. #60  -     tiZANidine (ZANAFLEX) 4 MG tablet; Take 1 tab po q hs #45  -     DULoxetine (CYMBALTA) 30 MG capsule; Take 1 capsule by mouth Daily. #30  - Written script for Physical Therapy to evaluate and treat  - Lumbar spine meek of motion stretches daily  - Core strengthening exercises  - Gentle massage prn  - Lifting mechanics reviewed  - Avoid overuse  - Fall precautions  - Consider imaging if not improved.

## 2021-11-16 ENCOUNTER — LAB (OUTPATIENT)
Dept: LAB | Facility: HOSPITAL | Age: 76
End: 2021-11-16

## 2021-11-16 ENCOUNTER — OFFICE VISIT (OUTPATIENT)
Dept: FAMILY MEDICINE CLINIC | Facility: CLINIC | Age: 76
End: 2021-11-16

## 2021-11-16 VITALS
OXYGEN SATURATION: 99 % | DIASTOLIC BLOOD PRESSURE: 60 MMHG | WEIGHT: 119 LBS | HEART RATE: 79 BPM | SYSTOLIC BLOOD PRESSURE: 128 MMHG

## 2021-11-16 DIAGNOSIS — E11.65 UNCONTROLLED TYPE 2 DIABETES MELLITUS WITH HYPERGLYCEMIA (HCC): Primary | ICD-10-CM

## 2021-11-16 DIAGNOSIS — Z12.5 PROSTATE CANCER SCREENING: ICD-10-CM

## 2021-11-16 DIAGNOSIS — G89.29 CHRONIC PAIN OF RIGHT KNEE: ICD-10-CM

## 2021-11-16 DIAGNOSIS — E11.65 UNCONTROLLED TYPE 2 DIABETES MELLITUS WITH HYPERGLYCEMIA (HCC): ICD-10-CM

## 2021-11-16 DIAGNOSIS — J02.9 SORE THROAT: ICD-10-CM

## 2021-11-16 DIAGNOSIS — M25.551 RIGHT HIP PAIN: ICD-10-CM

## 2021-11-16 DIAGNOSIS — M25.561 CHRONIC PAIN OF RIGHT KNEE: ICD-10-CM

## 2021-11-16 LAB
ALBUMIN SERPL-MCNC: 4.1 G/DL (ref 3.5–5.2)
ALBUMIN/GLOB SERPL: 1.4 G/DL
ALP SERPL-CCNC: 114 U/L (ref 39–117)
ALT SERPL W P-5'-P-CCNC: 25 U/L (ref 1–41)
ANION GAP SERPL CALCULATED.3IONS-SCNC: 8.5 MMOL/L (ref 5–15)
AST SERPL-CCNC: 17 U/L (ref 1–40)
BASOPHILS # BLD AUTO: 0.09 10*3/MM3 (ref 0–0.2)
BASOPHILS NFR BLD AUTO: 0.8 % (ref 0–1.5)
BILIRUB SERPL-MCNC: 0.2 MG/DL (ref 0–1.2)
BUN SERPL-MCNC: 25 MG/DL (ref 8–23)
BUN/CREAT SERPL: 27.2 (ref 7–25)
CALCIUM SPEC-SCNC: 9.3 MG/DL (ref 8.6–10.5)
CHLORIDE SERPL-SCNC: 98 MMOL/L (ref 98–107)
CHOLEST SERPL-MCNC: 210 MG/DL (ref 0–200)
CO2 SERPL-SCNC: 25.5 MMOL/L (ref 22–29)
CREAT SERPL-MCNC: 0.92 MG/DL (ref 0.76–1.27)
DEPRECATED RDW RBC AUTO: 50.2 FL (ref 37–54)
EOSINOPHIL # BLD AUTO: 0.08 10*3/MM3 (ref 0–0.4)
EOSINOPHIL NFR BLD AUTO: 0.7 % (ref 0.3–6.2)
ERYTHROCYTE [DISTWIDTH] IN BLOOD BY AUTOMATED COUNT: 13.2 % (ref 12.3–15.4)
EXPIRATION DATE: NORMAL
EXPIRATION DATE: NORMAL
GFR SERPL CREATININE-BSD FRML MDRD: 80 ML/MIN/1.73
GLOBULIN UR ELPH-MCNC: 3 GM/DL
GLUCOSE BLDC GLUCOMTR-MCNC: 423 MG/DL (ref 70–130)
GLUCOSE SERPL-MCNC: 379 MG/DL (ref 65–99)
HBA1C MFR BLD: 11.73 % (ref 4.8–5.6)
HBA1C MFR BLD: 12.3 %
HCT VFR BLD AUTO: 42.7 % (ref 37.5–51)
HDLC SERPL-MCNC: 59 MG/DL (ref 40–60)
HGB BLD-MCNC: 13.3 G/DL (ref 13–17.7)
IMM GRANULOCYTES # BLD AUTO: 0.19 10*3/MM3 (ref 0–0.05)
IMM GRANULOCYTES NFR BLD AUTO: 1.8 % (ref 0–0.5)
INTERNAL CONTROL: NORMAL
LDLC SERPL CALC-MCNC: 117 MG/DL (ref 0–100)
LDLC/HDLC SERPL: 1.89 {RATIO}
LYMPHOCYTES # BLD AUTO: 2.76 10*3/MM3 (ref 0.7–3.1)
LYMPHOCYTES NFR BLD AUTO: 25.6 % (ref 19.6–45.3)
Lab: NORMAL
Lab: NORMAL
MCH RBC QN AUTO: 32 PG (ref 26.6–33)
MCHC RBC AUTO-ENTMCNC: 31.1 G/DL (ref 31.5–35.7)
MCV RBC AUTO: 102.9 FL (ref 79–97)
MONOCYTES # BLD AUTO: 0.62 10*3/MM3 (ref 0.1–0.9)
MONOCYTES NFR BLD AUTO: 5.8 % (ref 5–12)
NEUTROPHILS NFR BLD AUTO: 65.3 % (ref 42.7–76)
NEUTROPHILS NFR BLD AUTO: 7.03 10*3/MM3 (ref 1.7–7)
NRBC BLD AUTO-RTO: 0 /100 WBC (ref 0–0.2)
PLATELET # BLD AUTO: 287 10*3/MM3 (ref 140–450)
PMV BLD AUTO: 12.5 FL (ref 6–12)
POTASSIUM SERPL-SCNC: 5.1 MMOL/L (ref 3.5–5.2)
PROT SERPL-MCNC: 7.1 G/DL (ref 6–8.5)
RBC # BLD AUTO: 4.15 10*6/MM3 (ref 4.14–5.8)
S PYO AG THROAT QL: NEGATIVE
SODIUM SERPL-SCNC: 132 MMOL/L (ref 136–145)
TRIGL SERPL-MCNC: 198 MG/DL (ref 0–150)
VLDLC SERPL-MCNC: 34 MG/DL (ref 5–40)
WBC # BLD AUTO: 10.77 10*3/MM3 (ref 3.4–10.8)

## 2021-11-16 PROCEDURE — 99215 OFFICE O/P EST HI 40 MIN: CPT | Performed by: INTERNAL MEDICINE

## 2021-11-16 PROCEDURE — 84443 ASSAY THYROID STIM HORMONE: CPT

## 2021-11-16 PROCEDURE — 83036 HEMOGLOBIN GLYCOSYLATED A1C: CPT | Performed by: INTERNAL MEDICINE

## 2021-11-16 PROCEDURE — G0103 PSA SCREENING: HCPCS

## 2021-11-16 PROCEDURE — 84439 ASSAY OF FREE THYROXINE: CPT

## 2021-11-16 PROCEDURE — 83036 HEMOGLOBIN GLYCOSYLATED A1C: CPT

## 2021-11-16 PROCEDURE — 80061 LIPID PANEL: CPT

## 2021-11-16 PROCEDURE — 80053 COMPREHEN METABOLIC PANEL: CPT

## 2021-11-16 PROCEDURE — G2212 PROLONG OUTPT/OFFICE VIS: HCPCS | Performed by: INTERNAL MEDICINE

## 2021-11-16 PROCEDURE — 82962 GLUCOSE BLOOD TEST: CPT | Performed by: INTERNAL MEDICINE

## 2021-11-16 PROCEDURE — 85025 COMPLETE CBC W/AUTO DIFF WBC: CPT

## 2021-11-16 PROCEDURE — 3046F HEMOGLOBIN A1C LEVEL >9.0%: CPT | Performed by: INTERNAL MEDICINE

## 2021-11-16 PROCEDURE — 87880 STREP A ASSAY W/OPTIC: CPT | Performed by: INTERNAL MEDICINE

## 2021-11-16 RX ORDER — NAPROXEN 500 MG/1
500 TABLET ORAL 2 TIMES DAILY WITH MEALS
COMMUNITY
End: 2021-11-19 | Stop reason: SDUPTHER

## 2021-11-16 RX ORDER — INSULIN GLARGINE 100 [IU]/ML
INJECTION, SOLUTION SUBCUTANEOUS
COMMUNITY
End: 2021-11-19 | Stop reason: SDUPTHER

## 2021-11-16 NOTE — PROGRESS NOTES
Chief Complaint   Patient presents with   • Establish Care       HPI:  Michael Valles is a 76 y.o. male who presents today for establish care.   used throughout the visit.  His daughter is also present.  Reports a history of diabetes previously well controlled on Basaglar 20 units twice daily.  He was recently prescribed a new flex pen of Basaglar and is unsure how to use this.  He did take 20 units of his old Basaglar prescription a few days ago.  He continues on Metformin twice daily.  Reports of burning and tingling sensation of bilateral lower extremities, right worse than left.    ROS:  Constitutional: no fevers, night sweats or unexplained weight loss  Eyes: no vision changes  ENT: no runny nose, ear pain, sore throat  Cardio: no chest pain, palpitations  Pulm: no shortness of breath, wheezing, or cough  GI: no abdominal pain or changes in bowel movements  : no difficulty urinating  MSK: no difficulty ambulating, no joint pain  Neuro: no weakness, dizziness or headache  Psych: no trouble sleeping  Endo: no change in appetite      History reviewed. No pertinent past medical history.   History reviewed. No pertinent family history.   Social History     Socioeconomic History   • Marital status:       No Known Allergies     There is no immunization history on file for this patient.     PE:  Vitals:    11/16/21 0939   BP: 128/60   Pulse: 79   SpO2: 99%      There is no height or weight on file to calculate BMI.    Gen Appearance: NAD  HEENT: Normocephalic, PERRLA, no thyromegaly, trache midline  Heart: RRR, normal S1 and S2, no murmur  Lungs: CTA b/l, no wheezing, no crackles  Abdomen: Soft, non-tender, non-distended, no guarding and BSx4  MSK: Moves all extremities well, normal gait, no peripheral edema  Pulses: Palpable and equal b/l  Lymph nodes: No palpable lymphadenopathy   Neuro: No focal deficits      Current Outpatient Medications   Medication Sig Dispense Refill   •  Insulin Glargine (BASAGLAR KWIKPEN) 100 UNIT/ML injection pen Inject  under the skin into the appropriate area as directed.     • metFORMIN (GLUCOPHAGE) 1000 MG tablet Take 1,000 mg by mouth 2 (Two) Times a Day With Meals.     • naproxen (NAPROSYN) 500 MG tablet Take 500 mg by mouth 2 (Two) Times a Day With Meals.       No current facility-administered medications for this visit.      Counseling was given to patient and family for the following topics: diagnostic results, instructions for management, patient and family education, impressions, risks and benefits of treatment options and importance of treatment compliance . Total time of the encounter was 60 minutes and 35 minutes was spent face to face counseling.    Will need to get his diabetes under better control.  A1c nearly 13% today.  Resume prior dose of Basaglar 20 units twice a day.  Continue Metformin.  I suspect he would benefit from GLP-1 agonist.  See back in 1 week with fasting sugar readings.  Refer to orthopedics to establish care for chronic pain of right knee and hip.  Reports pain is much worse on the right compared to left which is not typical for peripheral neuropathy.  I do think neuropathy is contributing however since his A1c is so high.    He would benefit from diabetes education.    Diagnoses and all orders for this visit:    1. Uncontrolled type 2 diabetes mellitus with hyperglycemia (HCC) (Primary)  -     POC Glycosylated Hemoglobin (Hb A1C)  -     POCT Glucose  -     CBC & Differential; Future  -     Comprehensive Metabolic Panel; Future  -     Hemoglobin A1c; Future  -     Lipid Panel; Future  -     TSH+Free T4; Future  -     Urinalysis With Culture If Indicated - Urine, Clean Catch; Future  -     PSA Screen; Future  -     Microalbumin / Creatinine Urine Ratio - Urine, Clean Catch; Future    2. Chronic pain of right knee  -     Ambulatory Referral to Orthopedic Surgery    3. Right hip pain  -     Ambulatory Referral to Orthopedic  Surgery    4. Prostate cancer screening  -     PSA Screen; Future    5. Sore throat  -     POCT rapid strep A         No follow-ups on file.     Dictated Utilizing Dragon Dictation    Please note that portions of this note were completed with a voice recognition program.    Part of this note may be an electronic transcription/translation of spoken language to printed text using the Dragon Dictation System.

## 2021-11-17 LAB
PSA SERPL-MCNC: 0.1 NG/ML (ref 0–4)
T4 FREE SERPL-MCNC: 1.1 NG/DL (ref 0.93–1.7)
TSH SERPL DL<=0.05 MIU/L-ACNC: 1.41 UIU/ML (ref 0.27–4.2)

## 2021-11-18 ENCOUNTER — TELEPHONE (OUTPATIENT)
Dept: FAMILY MEDICINE CLINIC | Facility: CLINIC | Age: 76
End: 2021-11-18

## 2021-11-18 NOTE — TELEPHONE ENCOUNTER
PATIENT WAS IN ON Tuesday AND CHECKING ON THE STATUS OF HIS MEDICATIONS BEING SENT IN. THE BASAGLAR KWIKPEN, METFORMIN AND NAPROXEN ARE STILL NOT AT THE PHARMACY. CONFIRMED THAT [place] IN [facility] IS CORRECT.

## 2021-11-19 DIAGNOSIS — E11.65 UNCONTROLLED TYPE 2 DIABETES MELLITUS WITH HYPERGLYCEMIA (HCC): Primary | ICD-10-CM

## 2021-11-19 RX ORDER — NAPROXEN 500 MG/1
500 TABLET ORAL 2 TIMES DAILY WITH MEALS
Qty: 28 TABLET | Refills: 0 | Status: SHIPPED | OUTPATIENT
Start: 2021-11-19 | End: 2021-12-03

## 2021-11-19 RX ORDER — INSULIN GLARGINE 100 [IU]/ML
20 INJECTION, SOLUTION SUBCUTANEOUS 2 TIMES DAILY
Qty: 5 PEN | Refills: 11 | Status: SHIPPED | OUTPATIENT
Start: 2021-11-19 | End: 2021-12-20 | Stop reason: SDUPTHER

## 2021-11-22 ENCOUNTER — OFFICE VISIT (OUTPATIENT)
Dept: FAMILY MEDICINE CLINIC | Facility: CLINIC | Age: 76
End: 2021-11-22

## 2021-11-22 ENCOUNTER — HOSPITAL ENCOUNTER (OUTPATIENT)
Dept: GENERAL RADIOLOGY | Facility: HOSPITAL | Age: 76
Discharge: HOME | End: 2021-11-22
Admitting: INTERNAL MEDICINE

## 2021-11-22 VITALS
BODY MASS INDEX: 21.09 KG/M2 | HEIGHT: 63 IN | OXYGEN SATURATION: 99 % | SYSTOLIC BLOOD PRESSURE: 144 MMHG | WEIGHT: 119 LBS | DIASTOLIC BLOOD PRESSURE: 62 MMHG | HEART RATE: 87 BPM

## 2021-11-22 DIAGNOSIS — E11.65 UNCONTROLLED TYPE 2 DIABETES MELLITUS WITH HYPERGLYCEMIA (HCC): Primary | ICD-10-CM

## 2021-11-22 DIAGNOSIS — M79.671 RIGHT FOOT PAIN: ICD-10-CM

## 2021-11-22 DIAGNOSIS — M25.561 CHRONIC PAIN OF RIGHT KNEE: ICD-10-CM

## 2021-11-22 DIAGNOSIS — J02.9 SORE THROAT: ICD-10-CM

## 2021-11-22 DIAGNOSIS — J02.9 PHARYNGITIS, UNSPECIFIED ETIOLOGY: ICD-10-CM

## 2021-11-22 DIAGNOSIS — M25.551 RIGHT HIP PAIN: ICD-10-CM

## 2021-11-22 DIAGNOSIS — G89.29 CHRONIC PAIN OF RIGHT KNEE: ICD-10-CM

## 2021-11-22 LAB — GLUCOSE BLDC GLUCOMTR-MCNC: ABNORMAL MG/DL

## 2021-11-22 PROCEDURE — 82962 GLUCOSE BLOOD TEST: CPT | Performed by: INTERNAL MEDICINE

## 2021-11-22 PROCEDURE — 73630 X-RAY EXAM OF FOOT: CPT

## 2021-11-22 PROCEDURE — 99214 OFFICE O/P EST MOD 30 MIN: CPT | Performed by: INTERNAL MEDICINE

## 2021-11-22 RX ORDER — AMOXICILLIN 875 MG/1
875 TABLET, COATED ORAL 2 TIMES DAILY
Qty: 14 TABLET | Refills: 0 | Status: SHIPPED | OUTPATIENT
Start: 2021-11-22 | End: 2021-11-29

## 2021-11-22 RX ORDER — ORAL SEMAGLUTIDE 7 MG/1
7 TABLET ORAL DAILY
Qty: 90 TABLET | Refills: 1 | Status: SHIPPED | OUTPATIENT
Start: 2021-11-22 | End: 2022-04-29

## 2021-11-22 RX ORDER — BLOOD-GLUCOSE METER
1 KIT MISCELLANEOUS DAILY
Qty: 1 EACH | Refills: 0 | Status: SHIPPED | OUTPATIENT
Start: 2021-11-22 | End: 2022-03-02 | Stop reason: SDUPTHER

## 2021-11-22 RX ORDER — LANCETS 28 GAUGE
1 EACH MISCELLANEOUS DAILY
Qty: 100 EACH | Refills: 12 | Status: SHIPPED | OUTPATIENT
Start: 2021-11-22 | End: 2022-03-02 | Stop reason: SDUPTHER

## 2021-11-22 NOTE — PROGRESS NOTES
Chief Complaint   Patient presents with   • Diabetes   • Leg Pain     weakness and pain from right foot up to hip and heaviness        HPI:  Michael Valles is a 76 y.o. male who presents today for follow up. Lithuanian interpretor was used. Family member was present. Morning sugar readings current insulin dose has been .  Continues to take Metformin twice daily as well.  He is currently out of diabetic testing supplies.  Continues to complain of right hip right knee and right ankle pain.  He has had a sore throat for the past 10 days.    ROS:  Constitutional: no fevers, night sweats or unexplained weight loss  Eyes: no vision changes  ENT: no runny nose, ear pain, sore throat  Cardio: no chest pain, palpitations  Pulm: no shortness of breath, wheezing, or cough  GI: no abdominal pain or changes in bowel movements  : no difficulty urinating  MSK: no difficulty ambulating, no joint pain  Neuro: no weakness, dizziness or headache  Psych: no trouble sleeping  Endo: no change in appetite      History reviewed. No pertinent past medical history.   History reviewed. No pertinent family history.   Social History     Socioeconomic History   • Marital status:    Tobacco Use   • Smoking status: Never Smoker   • Smokeless tobacco: Never Used   Substance and Sexual Activity   • Alcohol use: Never   • Drug use: Never   • Sexual activity: Defer      No Known Allergies     There is no immunization history on file for this patient.     PE:  Vitals:    11/22/21 1502   BP: 144/62   Pulse: 87   SpO2: 99%      Body mass index is 21.08 kg/m².    Gen Appearance: NAD  HEENT: Normocephalic, PERRLA, no thyromegaly, trache midline  Heart: RRR, normal S1 and S2, no murmur  Lungs: CTA b/l, no wheezing, no crackles  Abdomen: Soft, non-tender, non-distended, no guarding and BSx4  MSK: Moves all extremities well, normal gait, no peripheral edema  Pulses: Palpable and equal b/l  Lymph nodes: No palpable lymphadenopathy   Neuro:  No focal deficits      Current Outpatient Medications   Medication Sig Dispense Refill   • Insulin Glargine (BASAGLAR KWIKPEN) 100 UNIT/ML injection pen Inject 20 Units under the skin into the appropriate area as directed 2 (Two) Times a Day. 5 pen 11   • metFORMIN (GLUCOPHAGE) 1000 MG tablet Take 1 tablet by mouth 2 (Two) Times a Day With Meals. 180 tablet 3   • naproxen (NAPROSYN) 500 MG tablet Take 1 tablet by mouth 2 (Two) Times a Day With Meals for 14 days. 28 tablet 0   • amoxicillin (AMOXIL) 875 MG tablet Take 1 tablet by mouth 2 (Two) Times a Day for 7 days. 14 tablet 0   • Diclofenac Sodium (VOLTAREN) 1 % gel gel Apply 4 g topically to the appropriate area as directed 4 (Four) Times a Day As Needed (foot pain). 100 g 0   • glucose blood test strip 1 each by Other route Daily. Use as instructed 600 each 12   • glucose monitor monitoring kit 1 each Daily. 1 each 0   • Lancets (freestyle) lancets 1 each by Other route Daily. 100 each 12   • Semaglutide (Rybelsus) 7 MG tablet Take 7 mg by mouth Daily. 90 tablet 1     No current facility-administered medications for this visit.        Diagnoses and all orders for this visit:    1. Uncontrolled type 2 diabetes mellitus with hyperglycemia (HCC) (Primary)  -     POCT Glucose  -     glucose blood test strip; 1 each by Other route Daily. Use as instructed  Dispense: 600 each; Refill: 12  -     Lancets (freestyle) lancets; 1 each by Other route Daily.  Dispense: 100 each; Refill: 12  -     glucose monitor monitoring kit; 1 each Daily.  Dispense: 1 each; Refill: 0  -     Semaglutide (Rybelsus) 7 MG tablet; Take 7 mg by mouth Daily.  Dispense: 90 tablet; Refill: 1  I suspect his sugar readings are incorrect.  Have sent a new diabetic testing supplies.  POCT glucose here was too high to read.  Continue 20 units twice daily for now, continue Metformin and add on Rybelsus.  2. Sore throat    3. Pharyngitis, unspecified etiology  -     amoxicillin (AMOXIL) 875 MG tablet;  Take 1 tablet by mouth 2 (Two) Times a Day for 7 days.  Dispense: 14 tablet; Refill: 0    4. Right hip pain    5. Right foot pain  -     Diclofenac Sodium (VOLTAREN) 1 % gel gel; Apply 4 g topically to the appropriate area as directed 4 (Four) Times a Day As Needed (foot pain).  Dispense: 100 g; Refill: 0  -     XR Foot 3+ View Right; Future  -     Ambulatory Referral to Podiatry    6. Chronic pain of right knee         Return in about 2 weeks (around 12/6/2021) for diabetes.     Dictated Utilizing Dragon Dictation    Please note that portions of this note were completed with a voice recognition program.    Part of this note may be an electronic transcription/translation of spoken language to printed text using the Dragon Dictation System.

## 2021-11-23 ENCOUNTER — HOSPITAL ENCOUNTER (OUTPATIENT)
Dept: GENERAL RADIOLOGY | Facility: HOSPITAL | Age: 76
Discharge: HOME | End: 2021-11-23
Admitting: INTERNAL MEDICINE

## 2021-11-23 DIAGNOSIS — M25.561 CHRONIC PAIN OF RIGHT KNEE: ICD-10-CM

## 2021-11-23 DIAGNOSIS — M25.559 HIP PAIN: ICD-10-CM

## 2021-11-23 DIAGNOSIS — M25.559 HIP PAIN: Primary | ICD-10-CM

## 2021-11-23 DIAGNOSIS — G89.29 CHRONIC PAIN OF RIGHT KNEE: ICD-10-CM

## 2021-11-23 PROCEDURE — 73560 X-RAY EXAM OF KNEE 1 OR 2: CPT

## 2021-11-23 PROCEDURE — 73502 X-RAY EXAM HIP UNI 2-3 VIEWS: CPT

## 2021-12-06 ENCOUNTER — LAB (OUTPATIENT)
Dept: LAB | Facility: HOSPITAL | Age: 76
End: 2021-12-06

## 2021-12-06 ENCOUNTER — OFFICE VISIT (OUTPATIENT)
Dept: FAMILY MEDICINE CLINIC | Facility: CLINIC | Age: 76
End: 2021-12-06

## 2021-12-06 VITALS
TEMPERATURE: 97.6 F | HEART RATE: 68 BPM | OXYGEN SATURATION: 98 % | DIASTOLIC BLOOD PRESSURE: 68 MMHG | SYSTOLIC BLOOD PRESSURE: 160 MMHG | RESPIRATION RATE: 16 BRPM | HEIGHT: 63 IN | WEIGHT: 129.4 LBS | BODY MASS INDEX: 22.93 KG/M2

## 2021-12-06 DIAGNOSIS — M25.50 POLYARTHRALGIA: ICD-10-CM

## 2021-12-06 DIAGNOSIS — I10 PRIMARY HYPERTENSION: ICD-10-CM

## 2021-12-06 DIAGNOSIS — M25.551 RIGHT HIP PAIN: ICD-10-CM

## 2021-12-06 DIAGNOSIS — E11.65 UNCONTROLLED TYPE 2 DIABETES MELLITUS WITH HYPERGLYCEMIA (HCC): Primary | ICD-10-CM

## 2021-12-06 DIAGNOSIS — M16.11 PRIMARY OSTEOARTHRITIS OF RIGHT HIP: ICD-10-CM

## 2021-12-06 DIAGNOSIS — M25.561 CHRONIC PAIN OF RIGHT KNEE: ICD-10-CM

## 2021-12-06 DIAGNOSIS — G89.29 CHRONIC PAIN OF RIGHT KNEE: ICD-10-CM

## 2021-12-06 LAB — URATE SERPL-MCNC: 3.9 MG/DL (ref 3.4–7)

## 2021-12-06 PROCEDURE — 99214 OFFICE O/P EST MOD 30 MIN: CPT | Performed by: PHYSICIAN ASSISTANT

## 2021-12-06 PROCEDURE — 84550 ASSAY OF BLOOD/URIC ACID: CPT

## 2021-12-06 RX ORDER — LOSARTAN POTASSIUM 25 MG/1
25 TABLET ORAL DAILY
Qty: 90 TABLET | Refills: 0 | Status: SHIPPED | OUTPATIENT
Start: 2021-12-06 | End: 2022-03-03

## 2021-12-06 NOTE — PROGRESS NOTES
Chief Complaint   Patient presents with   • Diabetes     2 week follow up   • Hyperglycemia   • loss of feeling in feet     worse on right (burning of heels of bilateral feet)   • Hip Pain     right    • Knee Pain       HPI     Michael Valles is a 76 y.o. male who is here for 2 week follow-up of type 2 diabetes. The patient speaks Slovenian. A telehealth  was used for his visit. He is accompanied by a family member today also.      He saw Dr. Parada on 11/2 and was started on Rybelsus but he has been unable to get this medication from the pharmacy. He brings in some written FBGs ranging from 63 this morning to 121. Patient states he also had transient blurred vision this morning. He is on metformin and basaglar 20 units bid but after discussion it sounds like he has been trying to adjust his insulin dose on his own based on his glucoses. He is not on lisinopril but had been taking losartan from an old prescription from [place]. He has not had it for at least several days. He is still having right hip and knee pain.      Past Medical History:   Diagnosis Date   • BPH (benign prostatic hypertrophy)    • Colonoscopy refused    • Degenerative joint disease (DJD) of lumbar spine    • DM2 (diabetes mellitus, type 2) (HCC)    • HLD (hyperlipidemia)    • HTN (hypertension)    • Noncompliance        Past Surgical History:   Procedure Laterality Date   • CYSTOSCOPY TRANSURETHRAL RESECTION OF PROSTATE  2007       Family History   Problem Relation Age of Onset   • Diabetes Mother    • Stroke Father        Social History     Socioeconomic History   • Marital status:      Spouse name: N/A   • Number of children: 3   • Years of education: H.S.   • Highest education level: High school graduate   Tobacco Use   • Smoking status: Never Smoker   • Smokeless tobacco: Never Used   Vaping Use   • Vaping Use: Never used   Substance and Sexual Activity   • Alcohol use: Never   • Drug use: Never   • Sexual activity:  "Defer     Partners: Female       No Known Allergies    ROS    Review of Systems   Eyes: Negative for blurred vision.   Respiratory: Negative for shortness of breath.    Cardiovascular: Negative for chest pain.   Musculoskeletal: Positive for arthralgias.   Neurological: Positive for numbness. Negative for dizziness, light-headedness and headache.       Vitals:    12/06/21 1453   BP: 160/68   Pulse: 68   Resp: 16   Temp: 97.6 °F (36.4 °C)   SpO2: 98%     Body mass index is 22.93 kg/m².      Current Outpatient Medications:   •  atorvastatin (LIPITOR) 80 MG tablet, Take 1 tablet by mouth Daily., Disp: 90 tablet, Rfl: 0  •  Blood Glucose Monitoring Suppl kit, Use to check blood sugar BID, Disp: 1 each, Rfl: 0  •  Diclofenac Sodium (VOLTAREN) 1 % gel gel, Apply 4 g topically to the appropriate area as directed 4 (Four) Times a Day As Needed (foot pain)., Disp: 100 g, Rfl: 0  •  DULoxetine (CYMBALTA) 30 MG capsule, Take 1 capsule by mouth Daily., Disp: 30 capsule, Rfl: 1  •  glucose blood test strip, 1 each by Other route Daily. Use as instructed, Disp: 600 each, Rfl: 12  •  glucose monitor monitoring kit, 1 each Daily., Disp: 1 each, Rfl: 0  •  Insulin Glargine (BASAGLAR KWIKPEN) 100 UNIT/ML injection pen, Inject 20 Units under the skin into the appropriate area as directed 2 (Two) Times a Day., Disp: 5 pen, Rfl: 11  •  Insulin Pen Needle (B-D UF III MINI PEN NEEDLES) 31G X 5 MM misc, Use to administer insulin q HS, Disp: 50 each, Rfl: 11  •  Insulin Pen Needle 32G X 6 MM misc, 1 each Daily., Disp: 100 each, Rfl: 11  •  Insulin Syringe-Needle U-100 (B-D INS SYR ULTRAFINE 1CC/31G) 31G X 5/16\" 1 ML misc, Use to administer insulin BID, Disp: 100 each, Rfl: 11  •  Lancets (ACCU-CHEK SOFT TOUCH) lancets, Use to check blood sugar TID, Disp: 100 each, Rfl: 12  •  Lancets (freestyle) lancets, 1 each by Other route Daily., Disp: 100 each, Rfl: 12  •  metFORMIN (GLUCOPHAGE) 1000 MG tablet, Take 1 tablet by mouth 2 (Two) Times a " Day With Meals., Disp: 180 tablet, Rfl: 3  •  Semaglutide (Rybelsus) 7 MG tablet, Take 7 mg by mouth Daily., Disp: 90 tablet, Rfl: 1  •  tiZANidine (ZANAFLEX) 4 MG tablet, Take 1 tab po q hs, Disp: 45 tablet, Rfl: 0  •  losartan (Cozaar) 25 MG tablet, Take 1 tablet by mouth Daily., Disp: 90 tablet, Rfl: 0    PE    Physical Exam  Vitals reviewed.   Constitutional:       General: He is not in acute distress.     Appearance: He is well-developed.   HENT:      Head: Normocephalic and atraumatic.   Eyes:      Conjunctiva/sclera: Conjunctivae normal.   Cardiovascular:      Rate and Rhythm: Normal rate and regular rhythm.      Heart sounds: Normal heart sounds. No murmur heard.      Pulmonary:      Effort: Pulmonary effort is normal.      Breath sounds: Normal breath sounds.   Musculoskeletal:      Cervical back: Normal range of motion.      Right hip: Tenderness present.      Right knee: No effusion or crepitus.        Legs:    Skin:     General: Skin is warm and dry.   Neurological:      Mental Status: He is alert.      Gait: Gait normal.   Psychiatric:         Speech: Speech normal.         Behavior: Behavior normal.         Results    Results for orders placed or performed in visit on 11/22/21   POCT Glucose    Specimen: Blood   Result Value Ref Range    Glucose         A/P    Problem List Items Addressed This Visit        Endocrine and Metabolic    Uncontrolled type 2 diabetes mellitus with hyperglycemia (HCC) - Primary  -Hx diabetes over 20 years  -A1C 11.7 on 11/16/21 labs  -Reduce basaglar to 16 units bid due to hypoglycemia. He was instructed to call the office if he observes any glucoses less than 80  -Counseled patient to take his prescribed dose of insulin each day and to avoid self-adjustment without confirming changes with his PCP  -Advised patient to begin monitoring 2 hr postprandial glucoses several times weekly  -I called the patient's pharmacy and confirmed they have his prescription for Rybelsus. It  sounds like it was not in stock when the patient attempted to  his prescription. I also gave him a 30-day sample of Rybelsus 3 mg for him to start prior to titration to the 7 mg prescription at his pharmacy  -Encouraged low carb diet, dietary information provided today  -Follow-up with Dr. Parada in 2 weeks or sooner if needed       Musculoskeletal and Injuries    Chronic pain of right knee    Relevant Orders    Ambulatory Referral to Orthopedic Surgery    Right hip pain    Relevant Orders    Ambulatory Referral to Orthopedic Surgery      Other Visit Diagnoses     Hypertension  -Blood pressure is elevated today  -Start losartan 25 mg daily    Polyarthralgia      -Right foot XR showed severe osteoarthritic change with small erosions at the first metatarsal head indicating possible gout  -Will check uric acid due to joint pain today  -Refer to orthopedic surgery for hip and knee pain. His hip and knee x-rays showed only mild arthritis    Relevant Orders    Uric acid (Completed)    Primary osteoarthritis of right hip              Plan of care was reviewed with patient at the conclusion of today's visit. Education was provided regarding diagnoses, management, and the importance of keeping follow-up appointments. The patient was counseled regarding the risks, benefits, and possible side-effects of treatment. Patient and/or family express understanding and agreement with the management plan.        RAHEL Gee

## 2021-12-06 NOTE — PATIENT INSTRUCTIONS
-Decrease your insulin dose to 16 units twice daily due to low blood sugars  -Start Rybelsus 3 mg daily for 30 days beginning the 7 mg dose.       Diabetes mellitus y nutrición, en adultos  Diabetes Mellitus and Nutrition, Adult  Si sufre de diabetes, o diabetes mellitus, es muy importante tener hábitos alimenticios saludables debido a que rosalba niveles de azúcar en la chanel (glucosa) se sea afectados en gran medida por lo que come y roque. Alexander alimentos saludables en las cantidades correctas, aproximadamente a la misma hora todos los días, lo ayudará a:  · Controlar la glucemia.  · Disminuir el riesgo de sufrir sherly enfermedad cardíaca.  · Mejorar la presión arterial.  · Alcanzar o mantener un peso saludable.  ¿Qué puede afectar mi plan de alimentación?  Todas las personas que sufren de diabetes son diferentes y cada sherly tiene necesidades diferentes en cuanto a un plan de alimentación. El médico puede recomendarle que trabaje con un nutricionista para elaborar el mejor plan para usted. Belle plan de alimentación puede variar según factores morenita:  · Las calorías que necesita.  · Los medicamentos que caitlin.  · Belle peso.  · Rosalba niveles de glucemia, presión arterial y colesterol.  · Belle nivel de actividad.  · Otras afecciones que tenga, morenita enfermedades cardíacas o renales.  ¿Cómo me afectan los carbohidratos?  Los carbohidratos, o hidratos de carbono, afectan belle nivel de glucemia más que cualquier otro tipo de alimento. La ingesta de carbohidratos naturalmente aumenta la cantidad de glucosa en la chanel. El recuento de carbohidratos es un método destinado a llevar un registro de la cantidad de carbohidratos que se consumen. El recuento de carbohidratos es importante para mantener la glucemia a un nivel saludable, especialmente si utiliza insulina o caitlin determinados medicamentos por vía oral para la diabetes.  Es importante conocer la cantidad de carbohidratos que se pueden ingerir en cada comida sin correr ningún riesgo.  [place] es diferente en cada persona. Garza nutricionista puede ayudarlo a calcular la cantidad de carbohidratos que debe ingerir en cada comida y en cada refrigerio.  ¿Cómo me afecta el alcohol?  El alcohol puede provocar disminuciones súbitas de la glucemia (hipoglucemia), especialmente si utiliza insulina o caitlin determinados medicamentos por vía oral para la diabetes. La hipoglucemia es sherly afección potencialmente mortal. Los síntomas de la hipoglucemia, morenita somnolencia, mareos y confusión, son similares a los síntomas de alexsander consumido demasiado alcohol.  · No josé miguel alcohol si:  ? Garza médico le indica no hacerlo.  ? Está embarazada, puede estar embarazada o está tratando de quedar embarazada.  · Si roque alcohol:  ? No josé miguel con el estómago vacío.  ? Limite la cantidad que roque:  § De 0 a 1 medida por día para las mujeres.  § De 0 a 2 medidas por día para los hombres.  ? Esté atento a la cantidad de alcohol que hay en las bebidas que caitlin. En los [place], sherly medida equivale a sherly botella de cerveza de 12 oz (355 ml), un vaso de vino de 5 oz (148 ml) o un vaso de sherly bebida alcohólica de maciel graduación de 1½ oz (44 ml).  ? Manténgase hidratado bebiendo agua, refrescos dietéticos o té helado sin azúcar.  § Tenga en cuenta que los refrescos comunes, los jugos y otras bebida para mezclar pueden contener mucha azúcar y se deben contar morenita carbohidratos.  Consejos para seguir irineo plan    Leer las etiquetas de los alimentos  · Comience por leer el tamaño de la porción en la “Información nutricional” en las etiquetas de los alimentos envasados y las bebidas. La cantidad de calorías, carbohidratos, grasas y otros nutrientes mencionados en la etiqueta se basan en sherly porción del alimento. Muchos alimentos contienen más de sherly porción por envase.  · Verifique la cantidad total de gramos (g) de carbohidratos totales en sherly porción. Puede calcular la cantidad de porciones de carbohidratos al dividir el total de  carbohidratos por 15. Por ejemplo, si un alimento tiene un total de 30 g de carbohidratos totales por porción, equivale a 2 porciones de carbohidratos.  · Verifique la cantidad de gramos (g) de grasas saturadas y grasas trans de sherly porción. Escoja alimentos que no contengan estas grasas o que belle contenido de estas sea bajo.  · Verifique la cantidad de miligramos (mg) de sal (sodio) en sherly porción. La mayoría de las personas deben limitar la ingesta de sodio total a menos de 2300 mg por día.  · Siempre consulte la información nutricional de los alimentos etiquetados morenita “con bajo contenido de grasa” o “sin grasa”. Estos alimentos pueden tener un mayor contenido de azúcar agregada o carbohidratos refinados, y deben evitarse.  · Hable con belle nutricionista para identificar elyda objetivos diarios en cuanto a los nutrientes mencionados en la etiqueta.  Al ir de compras  · Evite comprar alimentos procesados, enlatados o precocidos. Estos alimentos tienden a tener sherly mayor cantidad de grasa, sodio y azúcar agregada.  · Compre en la christina exterior de la charity de comestibles. Esta es la christina donde se encuentran con mayor frecuencia las frutas y las verduras frescas, los cereales a granel, las ev frescas y los productos lácteos frescos.  Al cocinar  · Utilice métodos de cocción a baja temperatura, morenita hornear, en lugar de métodos de cocción a maciel temperatura, morenita freír en abundante aceite.  · Cocine con aceites saludables, morenita el aceite de petty, canola o girasol.  · Evite cocinar con manteca, crema o ev con alto contenido de grasa.  Planificación de las comidas  · Coma las comidas y los refrigerios regularmente, preferentemente a la misma hora todos los días. Evite pasar largos períodos de tiempo sin comer.  · Consuma alimentos ricos en fibra, morenita frutas frescas, verduras, frijoles y cereales integrales. Consulte a belle nutricionista sobre cuántas porciones de carbohidratos puede consumir en cada  comida.  · Consuma entre 4 y 6 onzas (entre 112 y 168 g) de proteínas magras por día, morenita elroy magras, kristofer, pescado, huevos o tofu. Sherly onza (oz) de proteína magra equivale a:  ? 1 onza (28 g) de carne, kristofer o pescado.  ? 1 huevo.  ? ¼ de taza (62 g) de tofu.  · Coma algunos alimentos por día que contengan grasas saludables, morenita aguacates, meghan secos, semillas y pescado.  ¿Qué alimentos agustina comer?  Frutas  Bayas. Manzanas. Naranjas. Duraznos. Damascos. Ciruelas. Uvas. [place]. Papaya. Palisade. Kiwi. Cerezas.  Verduras  Xuan. Espinaca. Verduras de hoja milton, que incluyen col rizada, acelga, hojas de berza y de mostaza. Remolachas. Coliflor. Repollo. Brócoli. Zanahorias. Judías verdes. Tomates. Pimientos. Cebollas. Pepinos. Cobb de Bruselas.  Granos  Granos integrales, morenita panes, galletas, tortillas, cereales y pastas de salvado o integrales. Elina sin azúcar. Quinua. Arroz integral o georgina.  Elroy y otras proteínas  Mariscos. Carne de ave sin piel. Nelson magros de ave y carne de res. Tofu. Meghan secos. Semillas.  Lácteos  Productos lácteos sin grasa o con bajo contenido de grasa, morenita leche, yogur y queso.  Es posible que los productos que se enumeran más arriba no constituyan sherly lista completa de los alimentos y las bebidas que puede gutierrez. Consulte a un nutricionista para obtener más información.  ¿Qué alimentos agustina evitar?  Frutas  Frutas enlatadas al almíbar.  Verduras  Verduras enlatadas. Verduras congeladas con mantequilla o salsa de crema.  Granos  Productos elaborados con harina y harina nikki refinada, morenita panes, pastas, bocadillos y cereales. Evite todos los alimentos procesados.  Elroy y otras proteínas  Nelson de carne con alto contenido de grasa. Carne de ave con piel. Elroy empanizadas o fritas. Carne procesada. Evite las grasas saturadas.  Lácteos  Yogur, queso o leche enteros.  Bebidas  Bebidas azucaradas, morenita gaseosas o té helado.  Es posible que los productos que se  enumeran más arriba no constituyan sherly lista completa de los alimentos y las bebidas que debe evitar. Consulte a un nutricionista para obtener más información.  Preguntas para hacerle al médico  · ¿Es necesario que me reúna con un instructor en el cuidado de la diabetes?  · ¿Es necesario que me reúna con un nutricionista?  · ¿A qué número puedo llamar si tengo preguntas?  · ¿Cuáles son los mejores momentos para controlar la glucemia?  Dónde encontrar más información:  · Asociación Estadounidense de la Diabetes (American Diabetes Association): diabetes.org  · Academy of Nutrition and Dietetics (Academia de Nutrición y Dietética): www.eatright.org  · National [place] of Diabetes and Digestive and Kidney Diseases (Instituto Nacional de la Diabetes y las Enfermedades Digestivas y Renales): www.niddk.nih.gov  · Association of Diabetes Care and Education Specialists (Asociación de Especialistas en Atención y Educación sobre la Diabetes): www.diabeteseducator.org  Resumen  · Es importante tener hábitos alimenticios saludables debido a que leyda niveles de azúcar en la chanel (glucosa) se sea afectados en gran medida por lo que come y roque.  · Un plan de alimentación saludable lo ayudará a controlar la glucemia y mantener un estilo de natalya saludable.  · El médico puede recomendarle que trabaje con un nutricionista para elaborar el mejor plan para usted.  · Tenga en cuenta que los carbohidratos (hidratos de carbono) y el alcohol tienen efectos inmediatos en leyda niveles de glucemia. Es importante contar los carbohidratos que ingiere y consumir alcohol con akshat.  Esta información no tiene morenita fin reemplazar el consejo del médico. Asegúrese de hacerle al médico cualquier pregunta que tenga.  Document Revised: 01/21/2021 Document Reviewed: 01/21/2021  Elsevier Patient Education © 2021 Elsevier Inc.

## 2021-12-08 DIAGNOSIS — E11.65 UNCONTROLLED TYPE 2 DIABETES MELLITUS WITH HYPERGLYCEMIA (HCC): ICD-10-CM

## 2021-12-20 ENCOUNTER — OFFICE VISIT (OUTPATIENT)
Dept: FAMILY MEDICINE CLINIC | Facility: CLINIC | Age: 76
End: 2021-12-20

## 2021-12-20 VITALS
SYSTOLIC BLOOD PRESSURE: 128 MMHG | OXYGEN SATURATION: 96 % | DIASTOLIC BLOOD PRESSURE: 74 MMHG | HEART RATE: 68 BPM | WEIGHT: 129 LBS | BODY MASS INDEX: 22.86 KG/M2 | HEIGHT: 63 IN

## 2021-12-20 DIAGNOSIS — M15.9 OSTEOARTHRITIS OF MULTIPLE JOINTS, UNSPECIFIED OSTEOARTHRITIS TYPE: ICD-10-CM

## 2021-12-20 DIAGNOSIS — I10 PRIMARY HYPERTENSION: ICD-10-CM

## 2021-12-20 DIAGNOSIS — E11.65 UNCONTROLLED TYPE 2 DIABETES MELLITUS WITH HYPERGLYCEMIA (HCC): Primary | ICD-10-CM

## 2021-12-20 PROCEDURE — 99214 OFFICE O/P EST MOD 30 MIN: CPT | Performed by: INTERNAL MEDICINE

## 2021-12-20 RX ORDER — INSULIN GLARGINE 100 [IU]/ML
16 INJECTION, SOLUTION SUBCUTANEOUS 2 TIMES DAILY
Qty: 5 PEN | Refills: 11 | Status: SHIPPED | OUTPATIENT
Start: 2021-12-20 | End: 2022-03-02 | Stop reason: SDUPTHER

## 2021-12-20 NOTE — PROGRESS NOTES
Chief Complaint   Patient presents with   • Diabetes     2 wk f/u Pierre MCGINNIS    • Leg Pain   • Foot Pain       HPI:  Michael Valles is a 76 y.o. male who presents today for follow-up diabetes and arthritis pain.  Sugars typically 130s to 180s.  Translation via .    ROS:  Constitutional: no fevers, night sweats or unexplained weight loss  Eyes: no vision changes  ENT: no runny nose, ear pain, sore throat  Cardio: no chest pain, palpitations  Pulm: no shortness of breath, wheezing, or cough  GI: no abdominal pain or changes in bowel movements  : no difficulty urinating  MSK: no difficulty ambulating, no joint pain  Neuro: no weakness, dizziness or headache  Psych: no trouble sleeping  Endo: no change in appetite      Past Medical History:   Diagnosis Date   • BPH (benign prostatic hypertrophy)    • Colonoscopy refused    • Degenerative joint disease (DJD) of lumbar spine    • DM2 (diabetes mellitus, type 2) (HCC)    • HLD (hyperlipidemia)    • HTN (hypertension)    • Noncompliance       Family History   Problem Relation Age of Onset   • Diabetes Mother    • Stroke Father       Social History     Socioeconomic History   • Marital status:      Spouse name: N/A   • Number of children: 3   • Years of education: H.S.   • Highest education level: High school graduate   Tobacco Use   • Smoking status: Never Smoker   • Smokeless tobacco: Never Used   Vaping Use   • Vaping Use: Never used   Substance and Sexual Activity   • Alcohol use: Never   • Drug use: Never   • Sexual activity: Defer     Partners: Female      No Known Allergies   Immunization History   Administered Date(s) Administered   • Fluzone High Dose =>65 Years ([employer] ONLY) 09/15/2017, 10/26/2018, 10/03/2019   • Pneumococcal Conjugate 13-Valent (PCV13) 09/15/2017   • Pneumococcal Polysaccharide (PPSV23) 10/26/2018        PE:  Vitals:    12/20/21 1440   BP: 128/74   Pulse: 68   SpO2: 96%      Body mass index is 22.86  "kg/m².    Gen Appearance: NAD  HEENT: Normocephalic, PERRLA, no thyromegaly, trache midline  Heart: RRR, normal S1 and S2, no murmur  Lungs: CTA b/l, no wheezing, no crackles  Abdomen: Soft, non-tender, non-distended, no guarding and BSx4  MSK: Moves all extremities well, normal gait, no peripheral edema  Pulses: Palpable and equal b/l  Lymph nodes: No palpable lymphadenopathy   Neuro: No focal deficits      Current Outpatient Medications   Medication Sig Dispense Refill   • atorvastatin (LIPITOR) 80 MG tablet Take 1 tablet by mouth Daily. 90 tablet 0   • Blood Glucose Monitoring Suppl kit Use to check blood sugar BID 1 each 0   • Diclofenac Sodium (VOLTAREN) 1 % gel gel Apply 4 g topically to the appropriate area as directed 4 (Four) Times a Day As Needed (foot pain). 100 g 0   • DULoxetine (CYMBALTA) 30 MG capsule Take 1 capsule by mouth Daily. 30 capsule 1   • glucose blood test strip 1 each by Other route Daily. Use as instructed 600 each 12   • glucose monitor monitoring kit 1 each Daily. 1 each 0   • Insulin Glargine (BASAGLAR KWIKPEN) 100 UNIT/ML injection pen Inject 16 Units under the skin into the appropriate area as directed 2 (Two) Times a Day. 5 pen 11   • Insulin Pen Needle (B-D UF III MINI PEN NEEDLES) 31G X 5 MM misc Use to administer insulin q HS 50 each 11   • Insulin Pen Needle 32G X 6 MM misc 1 each Daily. 100 each 11   • Insulin Syringe-Needle U-100 (B-D INS SYR ULTRAFINE 1CC/31G) 31G X 5/16\" 1 ML misc Use to administer insulin  each 11   • Lancets (ACCU-CHEK SOFT TOUCH) lancets Use to check blood sugar  each 12   • Lancets (freestyle) lancets 1 each by Other route Daily. 100 each 12   • losartan (Cozaar) 25 MG tablet Take 1 tablet by mouth Daily. 90 tablet 0   • metFORMIN (GLUCOPHAGE) 1000 MG tablet Take 1 tablet by mouth 2 (Two) Times a Day With Meals. 180 tablet 3   • Semaglutide (Rybelsus) 7 MG tablet Take 7 mg by mouth Daily. 90 tablet 1   • tiZANidine (ZANAFLEX) 4 MG tablet " Take 1 tab po q hs 45 tablet 0     No current facility-administered medications for this visit.        Diagnoses and all orders for this visit:    1. Uncontrolled type 2 diabetes mellitus with hyperglycemia (HCC) (Primary)  -     Insulin Glargine (BASAGLAR KWIKPEN) 100 UNIT/ML injection pen; Inject 16 Units under the skin into the appropriate area as directed 2 (Two) Times a Day.  Dispense: 5 pen; Refill: 11  Continue current insulin regimen.  If his fasting sugars are 130-180 then he should see a significant improvement in A1c over the next 2 months.  Continue Metformin and Rybelsus as well as 16 units twice daily glargine.  Encouraged dietary compliance.  2. Osteoarthritis of multiple joints, unspecified osteoarthritis type  Information given for orthopedic appointment.  3. Primary hypertension  Stable today.  Continue current medication.       Return in about 2 months (around 2/20/2022).     Dictated Utilizing Dragon Dictation    Please note that portions of this note were completed with a voice recognition program.    Part of this note may be an electronic transcription/translation of spoken language to printed text using the Dragon Dictation System.

## 2021-12-21 ENCOUNTER — TELEPHONE (OUTPATIENT)
Dept: ORTHOPEDIC SURGERY | Facility: CLINIC | Age: 76
End: 2021-12-21

## 2021-12-21 NOTE — TELEPHONE ENCOUNTER
Caller: PALOMA JOSEPH    CALL BACK# [phone]    Type of visit: NEW PATIENT    Additional notes:*  NEEDED FOR 01 12 2022 APPTMT

## 2022-01-12 ENCOUNTER — OFFICE VISIT (OUTPATIENT)
Dept: ORTHOPEDIC SURGERY | Facility: CLINIC | Age: 77
End: 2022-01-12

## 2022-01-12 VITALS — HEIGHT: 63 IN | BODY MASS INDEX: 22.85 KG/M2 | WEIGHT: 128.97 LBS

## 2022-01-12 DIAGNOSIS — M54.16 LUMBAR RADICULOPATHY: ICD-10-CM

## 2022-01-12 DIAGNOSIS — M25.559 HIP PAIN: Primary | ICD-10-CM

## 2022-01-12 PROCEDURE — 99204 OFFICE O/P NEW MOD 45 MIN: CPT | Performed by: ORTHOPAEDIC SURGERY

## 2022-01-12 NOTE — PROGRESS NOTES
[facility] Note    Subjective     Chief Complaint   Patient presents with   • Right Hip - Pain   • Right Knee - Pain        HPI    Michael Valles is a 76 y.o. male who presents with new problem of: right hip pain and right knee pain.  Onset: atraumatic and gradual in nature. The issue has been ongoing for 4 month(s). Pain is a 8/10 on the pain scale. Pain is described as burning. Associated symptoms include pain. The pain is worse with walking and sleeping; nothing improve the pain. Previous treatments have included: nothing.  Pain is located in the posterior aspect of the hip and lower back area, and radiates into the right lower extremity with a burning sensation.  No groin pain.    I have reviewed the following portions of the patient's history and agree with: History of Present Illness and Review of Systems    Patient Active Problem List   Diagnosis   • Uncontrolled type 2 diabetes mellitus with hyperglycemia (HCC)   • Chronic pain of right knee   • Right hip pain   • HTN (hypertension)     Past Medical History:   Diagnosis Date   • BPH (benign prostatic hypertrophy)    • Colonoscopy refused    • Degenerative joint disease (DJD) of lumbar spine    • DM2 (diabetes mellitus, type 2) (HCC)    • HLD (hyperlipidemia)    • HTN (hypertension)    • Noncompliance       Past Surgical History:   Procedure Laterality Date   • CYSTOSCOPY TRANSURETHRAL RESECTION OF PROSTATE  2007      Family History   Problem Relation Age of Onset   • Diabetes Mother    • Stroke Father      Social History     Socioeconomic History   • Marital status:      Spouse name: N/A   • Number of children: 3   • Years of education: H.S.   • Highest education level: High school graduate   Tobacco Use   • Smoking status: Never Smoker   • Smokeless tobacco: Never Used   Vaping Use   • Vaping Use: Never used   Substance and Sexual Activity   • Alcohol use: Never   • Drug use: Never   • Sexual activity: Defer      "Partners: Female      Current Outpatient Medications on File Prior to Visit   Medication Sig Dispense Refill   • atorvastatin (LIPITOR) 80 MG tablet Take 1 tablet by mouth Daily. 90 tablet 0   • Blood Glucose Monitoring Suppl kit Use to check blood sugar BID 1 each 0   • Diclofenac Sodium (VOLTAREN) 1 % gel gel Apply 4 g topically to the appropriate area as directed 4 (Four) Times a Day As Needed (foot pain). 100 g 0   • DULoxetine (CYMBALTA) 30 MG capsule Take 1 capsule by mouth Daily. 30 capsule 1   • glucose blood test strip 1 each by Other route Daily. Use as instructed 600 each 12   • glucose monitor monitoring kit 1 each Daily. 1 each 0   • Insulin Glargine (BASAGLAR KWIKPEN) 100 UNIT/ML injection pen Inject 16 Units under the skin into the appropriate area as directed 2 (Two) Times a Day. 5 pen 11   • Insulin Pen Needle (B-D UF III MINI PEN NEEDLES) 31G X 5 MM misc Use to administer insulin q HS 50 each 11   • Insulin Pen Needle 32G X 6 MM misc 1 each Daily. 100 each 11   • Insulin Syringe-Needle U-100 (B-D INS SYR ULTRAFINE 1CC/31G) 31G X 5/16\" 1 ML misc Use to administer insulin  each 11   • Lancets (ACCU-CHEK SOFT TOUCH) lancets Use to check blood sugar  each 12   • Lancets (freestyle) lancets 1 each by Other route Daily. 100 each 12   • losartan (Cozaar) 25 MG tablet Take 1 tablet by mouth Daily. 90 tablet 0   • metFORMIN (GLUCOPHAGE) 1000 MG tablet Take 1 tablet by mouth 2 (Two) Times a Day With Meals. 180 tablet 3   • Semaglutide (Rybelsus) 7 MG tablet Take 7 mg by mouth Daily. 90 tablet 1   • tiZANidine (ZANAFLEX) 4 MG tablet Take 1 tab po q hs 45 tablet 0     No current facility-administered medications on file prior to visit.      No Known Allergies     Review of Systems     Objective      Physical Exam  Ht 160 cm (62.99\")   Wt 58.5 kg (128 lb 15.5 oz)   BMI 22.85 kg/m²     Body mass index is 22.85 kg/m².    General:   Mental Status:  Alert   Appearance: Cooperative, in no acute " distress   Build and Nutrition: Well-nourished well-developed male   Orientation: Alert and oriented to person, place and time   Posture: Normal   Gait: Unsteady gait    Integument:   Right hip: No skin lesions, no rash, no ecchymosis    Neurologic:   Sensation:    Right foot: Intact to light touch on the dorsal and plantar aspect   Motor:  Right lower extremity: 5/5 quadriceps, hamstrings, ankle dorsiflexors, and ankle plantar flexors    Vascular:   Right lower extremity: 2+ dorsalis pedis pulse, prompt capillary refill    Lower Extremities:   Right Hip:    Tenderness:  None    Swelling: None    Crepitus:  None    Atrophy:  None    Range of motion:  External Rotation: 30°       Internal Rotation: 30°       Flexion:  100°       Extension:  0°   Instability:  None  Deformities:  None  Functional testing: Negative Stinchfield    No leg length discrepancy      Imaging/Studies      Imaging Results (Last 24 Hours)     ** No results found for the last 24 hours. **        EXAMINATION: XR HIP W OR WO PELVIS 2-3 VIEW RIGHT-      INDICATION: hip pain; M25.559-Pain in unspecified hip     TECHNIQUE: 2 views of the right hip     COMPARISON: NONE     FINDINGS:      No acute fracture or malalignment. Mild osteoarthritic change. Mild  enthesopathic change of the greater trochanter.     No acute findings in the imaged pelvis. Mild degenerative change of the  pubic symphysis and sacral iliac joints appear congruent. Scattered  vascular calcifications are seen. Moderate multilevel degenerative disc  disease is noted in the partially imaged lower lumbar spine.      IMPRESSION:     No acute fracture or malalignment. Mild osteoarthritic change of the  right hip.     This report was finalized on 11/23/2021 6:21 PM by Pasquale Guerrero MD.    I reviewed the above imaging, and agree with the findings.  Significant lumbar degeneration is noted.      Assessment and Plan     Diagnoses and all orders for this visit:    1. Hip pain  (Primary)        1. Hip pain        I reviewed my findings with the patient via an .  Symptoms are not consistent with hip arthritis, and radiographs show only mild degenerative changes.  However, what can be seen of his lumbar spine on the AP of his hip shows significant lumbar degeneration.  This is most likely coming from his spine, and we will make a referral to see a spine specialist.    Return for Spine referral.      Farhan Martinez MD  01/12/22  18:31 EST

## 2022-01-19 ENCOUNTER — TELEPHONE (OUTPATIENT)
Dept: ORTHOPEDIC SURGERY | Facility: CLINIC | Age: 77
End: 2022-01-19

## 2022-01-19 NOTE — TELEPHONE ENCOUNTER
Provider: KELSEY  Caller: [facility] [address]  Phone Number: [phone]  [phone]  Reason for Call: FAMILY IS CALLING IN TO ASK WHEN THE SX WILL BE SCHEDULE FOR HIM.  THEY ARE REPORTING HE IS IN A INCREASED AMOUNT OF PAIN AND WISHES TO DO THIS AS SOON AS POSSIBLE.

## 2022-01-25 ENCOUNTER — TELEPHONE (OUTPATIENT)
Dept: ORTHOPEDIC SURGERY | Facility: CLINIC | Age: 77
End: 2022-01-25

## 2022-01-25 NOTE — TELEPHONE ENCOUNTER
Caller: Zohaib Morrison call back number:     Patient is needing: patient needs a  for tomorrows appointment.

## 2022-02-02 PROBLEM — M51.36 DDD (DEGENERATIVE DISC DISEASE), LUMBAR: Status: ACTIVE | Noted: 2022-02-02

## 2022-02-07 ENCOUNTER — TELEPHONE (OUTPATIENT)
Dept: NEUROSURGERY | Facility: CLINIC | Age: 77
End: 2022-02-07

## 2022-02-11 DIAGNOSIS — E11.65 UNCONTROLLED TYPE 2 DIABETES MELLITUS WITH HYPERGLYCEMIA: ICD-10-CM

## 2022-02-11 NOTE — TELEPHONE ENCOUNTER
Caller: RITA PEÑAPE    Relationship: Emergency Contact    Best call back number: [phone]    Requested Prescriptions:   Requested Prescriptions     Pending Prescriptions Disp Refills   • Semaglutide (Rybelsus) 7 MG tablet 90 tablet 1     Sig: Take 7 mg by mouth Daily.   • Insulin Glargine (BASAGLAR KWIKPEN) 100 UNIT/ML injection pen 5 pen 11     Sig: Inject 16 Units under the skin into the appropriate area as directed 2 (Two) Times a Day.        Pharmacy where request should be sent: [id number] - [place], [place] - [address]  AT [facility] [address] - [phone]  - [phone] FX     Additional details provided by patient:     Does the patient have less than a 3 day supply:  [x] Yes  [] No    Tylor Lawrence Rep   02/11/22 15:37 EST

## 2022-02-11 NOTE — TELEPHONE ENCOUNTER
Rx Refill Note  Requested Prescriptions     Pending Prescriptions Disp Refills   • Semaglutide (Rybelsus) 7 MG tablet 90 tablet 1     Sig: Take 7 mg by mouth Daily.   • Insulin Glargine (BASAGLAR KWIKPEN) 100 UNIT/ML injection pen 5 pen 11     Sig: Inject 16 Units under the skin into the appropriate area as directed 2 (Two) Times a Day.      Last office visit with prescribing clinician: 12/20/2021      Next office visit with prescribing clinician: Visit date not found            Bjorn Irwin MA  02/11/22, 16:09 EST

## 2022-02-14 NOTE — TELEPHONE ENCOUNTER
Rx Refill Note  Requested Prescriptions     Pending Prescriptions Disp Refills   • Semaglutide (Rybelsus) 7 MG tablet 90 tablet 1     Sig: Take 7 mg by mouth Daily.   • Insulin Glargine (BASAGLAR KWIKPEN) 100 UNIT/ML injection pen 5 pen 11     Sig: Inject 16 Units under the skin into the appropriate area as directed 2 (Two) Times a Day.      Last office visit with prescribing clinician: 12/20/2021      Next office visit with prescribing clinician: Visit date not found            Iker Marroquin MA  02/14/22, 11:49 EST     Unable to leave message.  not set up. Patient was to Return in about 2 months (around 2/20/2022). 2nd attempt

## 2022-02-15 RX ORDER — ORAL SEMAGLUTIDE 7 MG/1
7 TABLET ORAL DAILY
Qty: 90 TABLET | Refills: 1 | OUTPATIENT
Start: 2022-02-15

## 2022-02-15 RX ORDER — INSULIN GLARGINE 100 [IU]/ML
16 INJECTION, SOLUTION SUBCUTANEOUS 2 TIMES DAILY
Qty: 5 PEN | Refills: 11 | OUTPATIENT
Start: 2022-02-15

## 2022-02-15 NOTE — TELEPHONE ENCOUNTER
Tried calling patient no answer unable to leave vm.    Patient needs to schedule an appointment     3rd attempt

## 2022-02-28 ENCOUNTER — TELEPHONE (OUTPATIENT)
Dept: FAMILY MEDICINE CLINIC | Facility: CLINIC | Age: 77
End: 2022-02-28

## 2022-03-02 ENCOUNTER — OFFICE VISIT (OUTPATIENT)
Dept: FAMILY MEDICINE CLINIC | Facility: CLINIC | Age: 77
End: 2022-03-02

## 2022-03-02 VITALS
WEIGHT: 134 LBS | OXYGEN SATURATION: 98 % | BODY MASS INDEX: 23.74 KG/M2 | DIASTOLIC BLOOD PRESSURE: 68 MMHG | SYSTOLIC BLOOD PRESSURE: 122 MMHG | HEART RATE: 91 BPM | HEIGHT: 63 IN

## 2022-03-02 DIAGNOSIS — E11.65 UNCONTROLLED TYPE 2 DIABETES MELLITUS WITH HYPERGLYCEMIA: Primary | ICD-10-CM

## 2022-03-02 DIAGNOSIS — I10 PRIMARY HYPERTENSION: ICD-10-CM

## 2022-03-02 LAB
EXPIRATION DATE: ABNORMAL
HBA1C MFR BLD: 11.7 %
Lab: ABNORMAL

## 2022-03-02 PROCEDURE — 3046F HEMOGLOBIN A1C LEVEL >9.0%: CPT | Performed by: INTERNAL MEDICINE

## 2022-03-02 PROCEDURE — 99214 OFFICE O/P EST MOD 30 MIN: CPT | Performed by: INTERNAL MEDICINE

## 2022-03-02 PROCEDURE — 83036 HEMOGLOBIN GLYCOSYLATED A1C: CPT | Performed by: INTERNAL MEDICINE

## 2022-03-02 RX ORDER — AMLODIPINE BESYLATE 10 MG/1
TABLET ORAL
COMMUNITY
Start: 2022-02-24 | End: 2022-03-23 | Stop reason: SDUPTHER

## 2022-03-02 RX ORDER — ASPIRIN 81 MG/1
81 TABLET ORAL DAILY
COMMUNITY

## 2022-03-02 RX ORDER — INSULIN GLARGINE 100 [IU]/ML
18 INJECTION, SOLUTION SUBCUTANEOUS 2 TIMES DAILY
Qty: 5 PEN | Refills: 11 | Status: SHIPPED | OUTPATIENT
Start: 2022-03-02 | End: 2022-03-22 | Stop reason: SDUPTHER

## 2022-03-02 RX ORDER — GABAPENTIN 100 MG/1
CAPSULE ORAL
COMMUNITY
Start: 2022-02-24 | End: 2022-03-23 | Stop reason: SDUPTHER

## 2022-03-02 NOTE — PROGRESS NOTES
Chief Complaint   Patient presents with   • Diabetes       HPI:  Michael Valles is a 76 y.o. male who presents today for ER follow-up hyperglycemia.  Currently taking 16 units of Basaglar twice daily.  He stopped taking oral medication after ER visit.  Morning sugars are running in the three hundreds typically.    ROS:  Constitutional: no fevers, night sweats or unexplained weight loss  Eyes: no vision changes  ENT: no runny nose, ear pain, sore throat  Cardio: no chest pain, palpitations  Pulm: no shortness of breath, wheezing, or cough  GI: no abdominal pain or changes in bowel movements  : no difficulty urinating  MSK: no difficulty ambulating, no joint pain  Neuro: no weakness, dizziness or headache  Psych: no trouble sleeping  Endo: no change in appetite      Past Medical History:   Diagnosis Date   • BPH (benign prostatic hypertrophy)    • Colonoscopy refused    • Degenerative joint disease (DJD) of lumbar spine    • DM2 (diabetes mellitus, type 2) (HCC)    • HLD (hyperlipidemia)    • HTN (hypertension)    • Noncompliance       Family History   Problem Relation Age of Onset   • Diabetes Mother    • Stroke Father       Social History     Socioeconomic History   • Marital status:      Spouse name: N/A   • Number of children: 3   • Years of education: H.S.   • Highest education level: High school graduate   Tobacco Use   • Smoking status: Never Smoker   • Smokeless tobacco: Never Used   Vaping Use   • Vaping Use: Never used   Substance and Sexual Activity   • Alcohol use: Never   • Drug use: Never   • Sexual activity: Defer     Partners: Female      No Known Allergies   Immunization History   Administered Date(s) Administered   • Fluzone High Dose =>65 Years ([employer] ONLY) 09/15/2017, 10/26/2018, 10/03/2019   • Pneumococcal Conjugate 13-Valent (PCV13) 09/15/2017   • Pneumococcal Polysaccharide (PPSV23) 10/26/2018        PE:  Vitals:    03/02/22 1033   BP: 122/68   Pulse: 91   SpO2: 98%      Body  "mass index is 23.74 kg/m².    Gen Appearance: NAD  HEENT: Normocephalic, PERRLA, no thyromegaly, trache midline  Heart: RRR, normal S1 and S2, no murmur  Lungs: CTA b/l, no wheezing, no crackles  Abdomen: Soft, non-tender, non-distended, no guarding and BSx4  MSK: Moves all extremities well, normal gait, no peripheral edema  Pulses: Palpable and equal b/l  Lymph nodes: No palpable lymphadenopathy   Neuro: No focal deficits      Current Outpatient Medications   Medication Sig Dispense Refill   • amLODIPine (NORVASC) 10 MG tablet      • aspirin 81 MG EC tablet Take 81 mg by mouth Daily.     • atorvastatin (LIPITOR) 80 MG tablet Take 1 tablet by mouth Daily. 90 tablet 0   • Blood Glucose Monitoring Suppl kit Use to check blood sugar BID 1 each 0   • Diclofenac Sodium (VOLTAREN) 1 % gel gel Apply 4 g topically to the appropriate area as directed 4 (Four) Times a Day As Needed (foot pain). 100 g 0   • gabapentin (NEURONTIN) 100 MG capsule      • glucose blood test strip 1 each by Other route Daily. Use as instructed 600 each 12   • Insulin Glargine (BASAGLAR KWIKPEN) 100 UNIT/ML injection pen Inject 18 Units under the skin into the appropriate area as directed 2 (Two) Times a Day. 5 pen 11   • Insulin Pen Needle (B-D UF III MINI PEN NEEDLES) 31G X 5 MM misc Use to administer insulin q HS 50 each 11   • Insulin Syringe-Needle U-100 (B-D INS SYR ULTRAFINE 1CC/31G) 31G X 5/16\" 1 ML misc Use to administer insulin  each 11   • Lancets (ACCU-CHEK SOFT TOUCH) lancets Use to check blood sugar  each 12   • losartan (Cozaar) 25 MG tablet Take 1 tablet by mouth Daily. 90 tablet 0   • metFORMIN (GLUCOPHAGE) 1000 MG tablet Take 1 tablet by mouth 2 (Two) Times a Day With Meals. 180 tablet 3   • Semaglutide (Rybelsus) 7 MG tablet Take 7 mg by mouth Daily. 90 tablet 1   • tiZANidine (ZANAFLEX) 4 MG tablet Take 1 tab po q hs 45 tablet 0     No current facility-administered medications for this visit.        Diagnoses and " all orders for this visit:    1. Uncontrolled type 2 diabetes mellitus with hyperglycemia (HCC) (Primary)  -     POC Glycosylated Hemoglobin (Hb A1C)  -     Insulin Glargine (BASAGLAR KWIKPEN) 100 UNIT/ML injection pen; Inject 18 Units under the skin into the appropriate area as directed 2 (Two) Times a Day.  Dispense: 5 pen; Refill: 11  Recommend resuming Metformin and Rybelsus.  Increase Basaglar to 18 units twice daily.  Call back in 1 week with glucose readings.  2. Primary hypertension  Continue current medication.       Return in about 3 months (around 6/2/2022).     Dictated Utilizing Dragon Dictation    Please note that portions of this note were completed with a voice recognition program.    Part of this note may be an electronic transcription/translation of spoken language to printed text using the Dragon Dictation System.

## 2022-03-03 RX ORDER — LOSARTAN POTASSIUM 25 MG/1
25 TABLET ORAL DAILY
Qty: 90 TABLET | Refills: 0 | Status: SHIPPED | OUTPATIENT
Start: 2022-03-03 | End: 2022-03-23 | Stop reason: SDUPTHER

## 2022-03-22 ENCOUNTER — TELEPHONE (OUTPATIENT)
Dept: FAMILY MEDICINE CLINIC | Facility: CLINIC | Age: 77
End: 2022-03-22

## 2022-03-22 DIAGNOSIS — E11.65 UNCONTROLLED TYPE 2 DIABETES MELLITUS WITH HYPERGLYCEMIA: ICD-10-CM

## 2022-03-22 RX ORDER — INSULIN GLARGINE 100 [IU]/ML
18 INJECTION, SOLUTION SUBCUTANEOUS 2 TIMES DAILY
Qty: 5 PEN | Refills: 11 | Status: SHIPPED | OUTPATIENT
Start: 2022-03-22

## 2022-03-22 NOTE — TELEPHONE ENCOUNTER
Rx Refill Note  Requested Prescriptions     Signed Prescriptions Disp Refills   • Insulin Glargine (BASAGLAR KWIKPEN) 100 UNIT/ML injection pen 5 pen 11     Sig: Inject 18 Units under the skin into the appropriate area as directed 2 (Two) Times a Day.     Authorizing Provider: ANYI SHAY     Ordering User: NEDRA ADAMS      Last office visit with prescribing clinician: 3/2/2022      Next office visit with prescribing clinician: Visit date not found            Nedra Adams MA  03/22/22, 14:35 EDT     Contacted granddaughter to clarify, they would like Basaglar pen needles refills to [facility]

## 2022-03-22 NOTE — TELEPHONE ENCOUNTER
Caller: ELLE    Relationship: Grandchild    Best call back number: [phone]    Requested Prescriptions:   Requested Prescriptions      No prescriptions requested or ordered in this encounter        Pharmacy where request should be sent: [facility][id number] - [place] [address] AT [facility] [address] - [phone]  - [phone] FX     Additional details provided by patient:  GRANDDAUGHTER STATES THAT PATIENT IS REQUESTING A REFILL ON ACCU-CHECK INJECTION PEN    Does the patient have less than a 3 day supply:  [] Yes  [x] No    Tylor Elizalde Rep   03/22/22 14:23 EDT

## 2022-03-23 ENCOUNTER — OFFICE VISIT (OUTPATIENT)
Dept: NEUROSURGERY | Facility: CLINIC | Age: 77
End: 2022-03-23

## 2022-03-23 VITALS — TEMPERATURE: 98.4 F | SYSTOLIC BLOOD PRESSURE: 112 MMHG | DIASTOLIC BLOOD PRESSURE: 60 MMHG

## 2022-03-23 DIAGNOSIS — G89.29 CHRONIC BILATERAL LOW BACK PAIN WITH RIGHT-SIDED SCIATICA: Primary | ICD-10-CM

## 2022-03-23 DIAGNOSIS — M54.41 CHRONIC BILATERAL LOW BACK PAIN WITH RIGHT-SIDED SCIATICA: Primary | ICD-10-CM

## 2022-03-23 PROCEDURE — 99214 OFFICE O/P EST MOD 30 MIN: CPT | Performed by: PHYSICIAN ASSISTANT

## 2022-03-23 RX ORDER — GABAPENTIN 100 MG/1
200 CAPSULE ORAL 3 TIMES DAILY
Qty: 180 CAPSULE | Refills: 1 | Status: SHIPPED | OUTPATIENT
Start: 2022-03-23

## 2022-03-23 RX ORDER — LOSARTAN POTASSIUM 25 MG/1
25 TABLET ORAL DAILY
Qty: 15 TABLET | Refills: 0 | Status: SHIPPED | OUTPATIENT
Start: 2022-03-23

## 2022-03-23 RX ORDER — AMLODIPINE BESYLATE 10 MG/1
10 TABLET ORAL DAILY
Qty: 15 TABLET | Refills: 0 | Status: SHIPPED | OUTPATIENT
Start: 2022-03-23

## 2022-03-23 NOTE — PROGRESS NOTES
Subjective     Chief Complaint: Low back and right leg pain    Patient ID: Michael Valles is a 76 y.o. male seen for consultation today at the request of  Farhan Martinez MD    History of Present Illness    This is a 76-year-old gentleman who presents today with a 6-month history of low back pain radiating into his right lateral hip and right knee.  He describes the pain as a burning, aching sensation.  On occasion, with prolonged standing his right leg will buckle and give way.  He denies saddle anesthesia or bowel or bladder dysfunction.  He was started on gabapentin which mildly improves the pain.  He has been participating in physical therapy for 2 months which is not helpful.  He has been evaluated by orthopedics for his hip and knee which were not felt to be the etiology of his symptoms.     The visit today was performed with the assistance of a Argentine-speaking     The following portions of the patient's history were reviewed and updated as appropriate: allergies, current medications, past family history, past medical history, past social history, past surgical history and problem list.    Family history:   Family History   Problem Relation Age of Onset   • Diabetes Mother    • Stroke Father        Social history:   Social History     Socioeconomic History   • Marital status:      Spouse name: N/A   • Number of children: 3   • Years of education: H.S.   • Highest education level: High school graduate   Tobacco Use   • Smoking status: Never Smoker   • Smokeless tobacco: Never Used   Vaping Use   • Vaping Use: Never used   Substance and Sexual Activity   • Alcohol use: Never   • Drug use: Never   • Sexual activity: Defer     Partners: Female       Review of Systems   Constitutional: Negative for activity change, appetite change, chills, diaphoresis, fatigue, fever and unexpected weight change.   HENT: Negative for congestion, dental problem, drooling, ear discharge, ear pain, facial  swelling, hearing loss, mouth sores, nosebleeds, postnasal drip, rhinorrhea, sinus pressure, sinus pain, sneezing, sore throat, tinnitus, trouble swallowing and voice change.    Eyes: Negative for photophobia, pain, discharge, redness, itching and visual disturbance.   Respiratory: Negative for apnea, cough, choking, chest tightness, shortness of breath, wheezing and stridor.    Cardiovascular: Positive for leg swelling. Negative for chest pain and palpitations.   Gastrointestinal: Negative for abdominal distention, abdominal pain, anal bleeding, blood in stool, constipation, diarrhea, nausea, rectal pain and vomiting.   Endocrine: Negative for cold intolerance, heat intolerance, polydipsia, polyphagia and polyuria.   Genitourinary: Negative for decreased urine volume, difficulty urinating, dysuria, enuresis, flank pain, frequency, genital sores, hematuria and urgency.   Musculoskeletal: Positive for back pain and joint swelling. Negative for arthralgias, gait problem, myalgias, neck pain and neck stiffness.   Skin: Negative for color change, pallor, rash and wound.   Allergic/Immunologic: Negative for environmental allergies, food allergies and immunocompromised state.   Neurological: Negative for dizziness, tremors, seizures, syncope, facial asymmetry, speech difficulty, weakness, light-headedness, numbness and headaches.   Hematological: Negative for adenopathy. Does not bruise/bleed easily.   Psychiatric/Behavioral: Negative for agitation, behavioral problems, confusion, decreased concentration, dysphoric mood, hallucinations, self-injury, sleep disturbance and suicidal ideas. The patient is not nervous/anxious and is not hyperactive.        Objective   Blood pressure 112/60, temperature 98.4 °F (36.9 °C), temperature source Infrared.  There is no height or weight on file to calculate BMI.  Patient's There is no height or weight on file to calculate BMI. indicating that he is within normal range (BMI 18.5-24.9).  No BMI management plan needed..      Physical Exam  Constitutional:       Comments: Frail, elderly appearing gentleman.  He is using the assistance of a wheelchair today   HENT:      Head: Normocephalic and atraumatic.   Eyes:      Conjunctiva/sclera: Conjunctivae normal.   Pulmonary:      Effort: Pulmonary effort is normal.   Musculoskeletal:      Cervical back: Normal range of motion and neck supple.   Skin:     General: Skin is warm and dry.   Neurological:      Mental Status: He is alert and oriented to person, place, and time.      GCS: GCS eye subscore is 4. GCS verbal subscore is 5. GCS motor subscore is 6.      Sensory: Sensation is intact.      Motor: Weakness present.      Coordination: Romberg sign negative.      Gait: Gait abnormal.      Deep Tendon Reflexes: Reflexes are normal and symmetric.      Comments: Patient is using assistance in wheelchair today.  He is able to ambulate independently with a bent forward, antalgic gait.  Strength is intact to direct testing in lower extremities with the exception of some mild quad weakness on the right.  Sensation is intact to light touch testing and symmetric.  Clonus is absent.  Chloe sign is absent   Psychiatric:         Mood and Affect: Mood normal.         Behavior: Behavior normal.           Assessment/Plan       This is a 76-year-old gentleman with low back and right leg pain.  He could potentially have a right L4 radiculopathy.  He has been evaluated by orthopedics for his hip and knee which were not felt to be the source of his symptoms.  Physical therapy was unhelpful.  Gabapentin slightly improved his pain.  I have given him a new prescription for gabapentin 200 mg 3 times daily.  I have ordered an MRI of the lumbar spine.  Signs/symptoms of cauda equina syndrome reviewed with him.  I will follow-up with him after his MRI has been completed.    I discussed findings and recommendations with the patient and his son via an .    Of note, the  patient requested a short-term refill of his blood pressure medications as he does not yet have a primary care provider and has an appointment with a new PCP next week.  I did give him a 2-week supply and discussed with patient and family that we would not be able to refill these.    Diagnoses and all orders for this visit:    1. Chronic bilateral low back pain with right-sided sciatica (Primary)  -     MRI Lumbar Spine Without Contrast; Future    Other orders  -     amLODIPine (NORVASC) 10 MG tablet; Take 1 tablet by mouth Daily.  Dispense: 15 tablet; Refill: 0  -     losartan (COZAAR) 25 MG tablet; Take 1 tablet by mouth Daily.  Dispense: 15 tablet; Refill: 0  -     gabapentin (NEURONTIN) 100 MG capsule; Take 2 capsules by mouth 3 (Three) Times a Day.  Dispense: 180 capsule; Refill: 1        Return in about 4 weeks (around 4/20/2022), or if symptoms worsen or fail to improve.             Estefania Can PA-C

## 2022-03-29 DIAGNOSIS — E11.65 TYPE 2 DIABETES MELLITUS WITH HYPERGLYCEMIA, WITH LONG-TERM CURRENT USE OF INSULIN: ICD-10-CM

## 2022-03-29 DIAGNOSIS — Z79.4 TYPE 2 DIABETES MELLITUS WITH HYPERGLYCEMIA, WITH LONG-TERM CURRENT USE OF INSULIN: ICD-10-CM

## 2022-03-29 NOTE — TELEPHONE ENCOUNTER
Rx Refill Note  Requested Prescriptions     Pending Prescriptions Disp Refills   • Insulin Pen Needle (B-D UF III MINI PEN NEEDLES) 31G X 5 MM misc 50 each 11     Sig: Use to administer insulin q HS      Last office visit with prescribing clinician: 3/2/2022      Next office visit with prescribing clinician: Visit date not found            Lee Grey MA  03/29/22, 14:32 EDT

## 2022-03-29 NOTE — TELEPHONE ENCOUNTER
Caller: DOMINIQUE PEÑA    Relationship: GRAND DAUGHTER    Best call back number: [phone]    Requested Prescriptions:   Requested Prescriptions     Pending Prescriptions Disp Refills   • Insulin Pen Needle (B-D UF III MINI PEN NEEDLES) 31G X 5 MM misc 50 each 11     Sig: Use to administer insulin q HS        Pharmacy where request should be sent: [facility][id number] - [place], [place] - [address]  AT [facility] [address] - [phone]  - [phone] FX     Additional details provided by patient: PATIENT GRANDDAUGHTER CALL ON 03/22/2022 AND REQUESTED THE MED CHECK INSULIN PEN; WHEN SHE PICKED UP PRESCRIPTION THE TEST STRIPS IS WHAT WAS SENT IN.   \    ACCUCHEK FASTCLICKS PEN WITH NEEDLE INJESTION FOR INSULINE IS WHAT HE HAS BEEN USING.      Does the patient have less than a 3 day supply:  [x] Yes  [] No    Tylor Robertson Rep   03/29/22 12:33 EDT

## 2022-03-30 ENCOUNTER — TELEPHONE (OUTPATIENT)
Dept: FAMILY MEDICINE CLINIC | Facility: CLINIC | Age: 77
End: 2022-03-30

## 2022-03-30 RX ORDER — FLURBIPROFEN SODIUM 0.3 MG/ML
SOLUTION/ DROPS OPHTHALMIC
Qty: 50 EACH | Refills: 11 | Status: SHIPPED | OUTPATIENT
Start: 2022-03-30

## 2022-03-30 NOTE — TELEPHONE ENCOUNTER
Caller: RAFAEL     Relationship to patient:     Best call back number: [phone]    Patient is needing: RAFAEL STATES THE  ORDER: [id number] AND  ORDER: [id number] WERE NOT BACK YET WITH DATE AND SIGNATURE.    THIS WAS SENT LAST WEEK

## 2022-03-30 NOTE — TELEPHONE ENCOUNTER
[facility]. Left detailed message asking to resend needed orders. Provide fax and contact info

## 2022-04-06 ENCOUNTER — TELEPHONE (OUTPATIENT)
Dept: FAMILY MEDICINE CLINIC | Facility: CLINIC | Age: 77
End: 2022-04-06

## 2022-04-07 ENCOUNTER — TELEPHONE (OUTPATIENT)
Dept: FAMILY MEDICINE CLINIC | Facility: CLINIC | Age: 77
End: 2022-04-07

## 2022-04-07 NOTE — TELEPHONE ENCOUNTER
Wants verbal orders for    Discontinue nursing but will do physical therapy.     Call back [phone] to anyone

## 2022-04-20 ENCOUNTER — HOSPITAL ENCOUNTER (OUTPATIENT)
Dept: MRI IMAGING | Facility: HOSPITAL | Age: 77
Discharge: HOME | End: 2022-04-20
Admitting: PHYSICIAN ASSISTANT

## 2022-04-20 DIAGNOSIS — M54.41 CHRONIC BILATERAL LOW BACK PAIN WITH RIGHT-SIDED SCIATICA: ICD-10-CM

## 2022-04-20 DIAGNOSIS — G89.29 CHRONIC BILATERAL LOW BACK PAIN WITH RIGHT-SIDED SCIATICA: ICD-10-CM

## 2022-04-20 PROCEDURE — 72148 MRI LUMBAR SPINE W/O DYE: CPT

## 2022-04-21 ENCOUNTER — TELEPHONE (OUTPATIENT)
Dept: FAMILY MEDICINE CLINIC | Facility: CLINIC | Age: 77
End: 2022-04-21

## 2022-04-21 ENCOUNTER — TELEPHONE (OUTPATIENT)
Dept: NEUROSURGERY | Facility: CLINIC | Age: 77
End: 2022-04-21

## 2022-04-21 NOTE — TELEPHONE ENCOUNTER
Spoke w/ Pt with the help of  (Armani ID# [id number]) and got appt R/S for 5/9/22 @10am w/ Estefania Can PA-C. Also  on site is requested w/ Cornelius at [facility].- EC

## 2022-04-21 NOTE — TELEPHONE ENCOUNTER
Caller: alina     Relationship: Home Health [facility]     Best call back number:  [phone]    What orders are you requesting (i.e. lab or imaging): ORDER PAPERWORK FOR DC NURSING CARE PER PATIENTS REQUEST- ORDER NUMBER [id number]    PAPERWORK WAS FAXED OVER ON April THE 7 AND THE 14. CALLING TO CHECK THE STATUS OF THE SIGNED ORDERS

## 2022-04-28 DIAGNOSIS — E11.65 UNCONTROLLED TYPE 2 DIABETES MELLITUS WITH HYPERGLYCEMIA: ICD-10-CM

## 2022-04-29 RX ORDER — ORAL SEMAGLUTIDE 7 MG/1
TABLET ORAL
Qty: 90 TABLET | Refills: 0 | Status: SHIPPED | OUTPATIENT
Start: 2022-04-29

## 2022-04-29 NOTE — TELEPHONE ENCOUNTER
Rx Refill Note  Requested Prescriptions     Pending Prescriptions Disp Refills   • Rybelsus 7 MG tablet [Pharmacy Med Name: RYBELSUS 7MG TABLETS] 90 tablet 1     Sig: TAKE 1 TABLET BY MOUTH DAILY      Last office visit with prescribing clinician: 3/2/2022      Next office visit with prescribing clinician: Visit date not found            Lee Grey MA  04/29/22, 09:05 EDT

## 2022-07-27 DIAGNOSIS — E11.65 UNCONTROLLED TYPE 2 DIABETES MELLITUS WITH HYPERGLYCEMIA: ICD-10-CM

## 2022-08-01 DIAGNOSIS — E11.65 UNCONTROLLED TYPE 2 DIABETES MELLITUS WITH HYPERGLYCEMIA: ICD-10-CM

## 2022-08-01 RX ORDER — ORAL SEMAGLUTIDE 7 MG/1
TABLET ORAL
Qty: 90 TABLET | Refills: 0 | OUTPATIENT
Start: 2022-08-01

## 2022-08-01 NOTE — TELEPHONE ENCOUNTER
Rx Refill Note  Requested Prescriptions     Pending Prescriptions Disp Refills   • Rybelsus 7 MG tablet [Pharmacy Med Name: RYBELSUS 7MG TABLETS] 90 tablet 0     Sig: TAKE 1 TABLET BY MOUTH DAILY      Last office visit with prescribing clinician: 3/2/2022      Next office visit with prescribing clinician: Visit date not found            Lee Grey MA  08/01/22, 08:56 EDT     Patient was to follow up in 3 months called and left vm for patient to return call    OK FOR HUB TO RELAY MESSAGE AND SCHEDULE APPOINTMENT

## 2022-08-02 RX ORDER — ORAL SEMAGLUTIDE 7 MG/1
TABLET ORAL
Qty: 90 TABLET | Refills: 0 | OUTPATIENT
Start: 2022-08-02

## 2022-08-02 NOTE — TELEPHONE ENCOUNTER
Lft. Vm. Patient was to return in about 3 months (around 6/2/2022). 3rd attempt      HUBB MAY RELAY MESSAGE & SCHEDULE PATIENT

## 2023-09-19 ENCOUNTER — TELEPHONE (OUTPATIENT)
Dept: NEUROSURGERY | Facility: OTHER | Age: 78
End: 2023-09-19
Payer: MEDICARE

## 2023-09-19 NOTE — TELEPHONE ENCOUNTER
Provider: HARVINDER MCLEAN/ HOWARD SIGALA     Caller: JESSICA- [facility]     Relationship to Patient: PROVIDER    Phone Number: [phone]    Reason for Call: JESSICA CALLED IN TO INQUIRE IF HOWARD SIGALA WAS SEEING THE PATIENT ON 10/06/23@ 930 FOR LUMBAR SPINAL STENOSIS AND CEREBRAL ANEURYSM.    When was the patient last seen: 03/23/22- DERREK JACK PA-C FOR LOW BACK PAIN WITH [facility].    ADVISED WE DID NOT KNOW ABOUT THE ANEURYSM DUE TO NOT RECEIVING FAXES TILL YESTERDAY- 09/18/23-     SENT MESSAGE TO THE AGENT WHO SCHEDULED THE PATIENT AND SHE ADVISED ME TO SEND A MESSAGE ADVISING EVERYTHING-     PER MR ANGIOGRAM- 08/22/23 @ - PER PARTIALLY EMBOLIZED ANTERIOR COMMUNICATING ARTERY ANEURYSM, 3X5MM- IMAGING IN CHART UNDER MEDIA. -     JESSICA ALSO STATED THAT IN THE NOTES- PATIENTS DAUGHTER STATED THAT HE HAS NOT SEEN NEURO CONSULTANT IN THE PAST. -     PLEASE ADVISE ON HOW TO PROCEED- THANK YOU.

## 2023-09-20 NOTE — TELEPHONE ENCOUNTER
Called and talked to Maryanne Box (Pt's daughter) and let her know that appt is moved to Kenneth Doe PA-C when Dr. Daniel is in the ofc on 10/9/23. Mr. Valles is a Prev pt of Darcy Can. DLS 3/23/22. Requested Kinyarwanda Inter to be R/S for 10/9/23 to arrive @10:30 as well w/ Cornelius@ [employer].

## 2023-10-09 ENCOUNTER — OFFICE VISIT (OUTPATIENT)
Dept: NEUROSURGERY | Facility: CLINIC | Age: 78
End: 2023-10-09
Payer: MEDICARE

## 2023-10-09 VITALS
BODY MASS INDEX: 21.62 KG/M2 | HEIGHT: 63 IN | WEIGHT: 122 LBS | TEMPERATURE: 97.3 F | DIASTOLIC BLOOD PRESSURE: 90 MMHG | SYSTOLIC BLOOD PRESSURE: 130 MMHG

## 2023-10-09 DIAGNOSIS — I72.9 ANEURYSM OF ARTERY: ICD-10-CM

## 2023-10-09 DIAGNOSIS — M51.36 DDD (DEGENERATIVE DISC DISEASE), LUMBAR: ICD-10-CM

## 2023-10-09 DIAGNOSIS — M47.816 ARTHROPATHY OF LUMBAR FACET JOINT: ICD-10-CM

## 2023-10-09 DIAGNOSIS — I67.1 ANTERIOR COMMUNICATING ARTERY ANEURYSM: ICD-10-CM

## 2023-10-09 DIAGNOSIS — G89.29 CHRONIC BILATERAL LOW BACK PAIN WITH RIGHT-SIDED SCIATICA: Primary | ICD-10-CM

## 2023-10-09 DIAGNOSIS — M54.41 CHRONIC BILATERAL LOW BACK PAIN WITH RIGHT-SIDED SCIATICA: Primary | ICD-10-CM

## 2023-10-09 PROCEDURE — 3075F SYST BP GE 130 - 139MM HG: CPT

## 2023-10-09 PROCEDURE — 99214 OFFICE O/P EST MOD 30 MIN: CPT

## 2023-10-09 PROCEDURE — 3080F DIAST BP >= 90 MM HG: CPT

## 2023-10-09 NOTE — PROGRESS NOTES
"  Chief complaint:   Aneurysm of ACOM  2.   Lumbar stenosis with radiculopathy    History of present illness:  This is a 77-year-old  male who presents with his son and an , the patient has a complex medical presentation accented by recent medical need to amputate the lower portion of his left lower extremity due to uncontrollable infection impacted by a poorly controlled diabetes.  The patient presents in a wheelchair but is able to walk so long as he can hold onto a surface or use a walker.  The patient does have a prosthetic limb of the left side.  Imaging as detailed below in the physical exam reveals a 3 mm x 5 mm aneurysm in the ACOM.  It is unclear on imaging whether this is \"partially embolized\" aneurysm as reported on the report, or whether this has been left untreated to this point.    The patient's main complaint today is right-sided radicular pain most closely resembling an L4 distribution.  The patient has had this problem for many years and dealt with it, when he was in rehab for the recent amputation and prosthetic physical therapy, he had very little radicular pain while resting.  He now tells me that the pain is gotten much worse in the past 2 weeks and continues to become worse with more attempts at activity.      Imaging as detailed below in objective section reveals multiple findings such as loss of disc height and severe central and foraminal narrowing between the levels of L3-L4 and L4-L5.        Pertinent Medical/Surgical History: Poorly controlled diabetes with A1c being over 11 for the past 6 years or more.    ROS:  A 12 point review of systems was performed.  All systems reviewed were negative with the exception of those mentioned in the history of present illness.    Past medical history:  Past Medical History:   Diagnosis Date    BPH (benign prostatic hypertrophy)     Colonoscopy refused     Degenerative joint disease (DJD) of lumbar spine     DM2 (diabetes mellitus, " "type 2)     HLD (hyperlipidemia)     HTN (hypertension)     Noncompliance        Past surgical history:  Past Surgical History:   Procedure Laterality Date    CYSTOSCOPY TRANSURETHRAL RESECTION OF PROSTATE  2007       Social history:  Social History     Socioeconomic History    Marital status:      Spouse name: N/A    Number of children: 3    Years of education: H.S.    Highest education level: High school graduate   Tobacco Use    Smoking status: Never    Smokeless tobacco: Never   Vaping Use    Vaping Use: Never used   Substance and Sexual Activity    Alcohol use: Never    Drug use: Never    Sexual activity: Defer     Partners: Female       Family history:  Family History   Problem Relation Age of Onset    Diabetes Mother     Stroke Father        Allergies:  No Known Allergies    Outpatient medications:  Scheduled Meds:  Continuous Infusions:No current facility-administered medications for this visit.    PRN Meds:.    Physical Examination:    /90 (BP Location: Left arm, Patient Position: Sitting, Cuff Size: Adult)   Temp 97.3 øF (36.3 øC) (Infrared)   Ht 160 cm (63\")   Wt 55.3 kg (122 lb)   BMI 21.61 kg/mý       General: The patient is accompanied by an  and his son.  He is thin, in a wheelchair with a prosthetic left lower extremity.  The patient is able to walk holding onto a table or using a walker at home.  Neurological: Cranial nerves II through XII are grossly intact. Facial expressions are equal bilaterally.     Eyes: Extraocular eye movements are intact, and pupils are equal, round, and reactive to light.  Musculoskeletal:   Upper extremity strength is 5/5  Lower extremity strength is 5/5 with the exception of the left lower extremity prosthetic  Sensation: Pain in an L4 pattern from the posterior buttock around the hip to the lateral and anterior lateral aspect of the thigh.  He experiences pain around his knee.  He experiences some numbness in the anterolateral aspect of his " lower right limb.    There is no pronator drift.  Finger to nose testing is performed without dysmetria or bradykinesia.    There is no Clonus noted on achilles stretch. Alexis's sign is negative on exam.    Casual gait while supporting himself on the examination table shows good control even when changing direction and turning around.  It appears the patient is capable of performing most activities of daily living.       HEENT: Normocephalic, atraumatic.   Cardiovascular: Regular rate and rhythm.  No edema noted  Pulmonary: Normal respiratory effort  Abdomen: Nontender  Psychiatric: Normal mood and affect    Pertinent Labs:  Hemoglobin A1c.  Levels have been greater than 11 for all measurements in our system over the past 6 years.      Imaging:  MRI of the lumbar spine was performed in 4/22/2022.  This shows straightening of the lumbar spine, diffuse osteophytes at multiple levels, DDD throughout, near complete loss of disc height between L3-4 and L4-L5.  There are multiple disc bulges the most noticeable of which between L4-L5 which cause severe central canal narrowing, and foraminal narrowing on the right side.    Patient's MRA performed at  is notable for an anteriorly displaced 3 mm x 5 mm aneurysm at the anterior communicating artery.  This was reviewed by me, Dr. Daniel, Dr. Sims.  It is unclear whether there is any evidence of prior coiling or if the aneurysm has not yet been treated.  The patient had a poor history regarding this aspect, and with a history of visiting several hospital systems, records about aneurysm treatment were not found although attempts were made.      Assessment and Plan:  Diagnoses and all orders for this visit:    1. Chronic bilateral low back pain with right-sided sciatica (Primary)    2. Aneurysm of artery    3. Anterior communicating artery aneurysm    4. DDD (degenerative disc disease), lumbar    5. Arthropathy of lumbar facet joint        There is a lengthy history and exam  obtained with efforts from myself, Dr. Daniel, the , the patient, and his son.  Overall enough information was obtained to give recommendations for the patient's care.  The patient's low back issues would most appropriately require a spine specialist, which is available at , but not here.  Multiple factors include scoliosis, advanced arthritic changes, multiple levels of pathology.  After discussing the options, the patient and his son decided to also have the aneurysm assessed and possibly treated at  so they may do more the care at one location.  Again this is a complex case and will require collaboration for the best timings and methods of treatment of his back and aneurysm.    - Ambulatory referral to  neurosurgery and spine specialists has been sent, this is also the location that his MRA was performed before coming to see us in office today.    I conveyed through the  that physical therapy may be somewhat helpful as it has been in the past with physical therapy for the left leg.  Even if a surgery was performed, physical therapy will be key in reaching maximal recovery.  Both I and Dr. Daniel addressed the importance of controlling the patient's diabetes to minimize risks in the perioperative period.    AVANI Doe PA-C    10/09/23

## 2024-01-09 DIAGNOSIS — E11.65 UNCONTROLLED TYPE 2 DIABETES MELLITUS WITH HYPERGLYCEMIA: ICD-10-CM

## 2024-01-10 NOTE — TELEPHONE ENCOUNTER
Rx Refill Note  Requested Prescriptions     Pending Prescriptions Disp Refills    metFORMIN (GLUCOPHAGE) 1000 MG tablet [Pharmacy Med Name: METFORMIN 1000MG TABLETS] 180 tablet 3     Sig: TAKE 1 TABLET BY MOUTH TWICE DAILY WITH MEALS      Last office visit with prescribing clinician: 3/2/2022   Last telemedicine visit with prescribing clinician: Visit date not found   Next office visit with prescribing clinician: Visit date not found                         Would you like a call back once the refill request has been completed: [] Yes [] No    If the office needs to give you a call back, can they leave a voicemail: [] Yes [] No    Josee Retana MA  01/10/24, 07:42 EST